# Patient Record
Sex: FEMALE | Race: WHITE | Employment: FULL TIME | RURAL
[De-identification: names, ages, dates, MRNs, and addresses within clinical notes are randomized per-mention and may not be internally consistent; named-entity substitution may affect disease eponyms.]

---

## 2020-10-01 PROBLEM — K21.9 GASTROESOPHAGEAL REFLUX DISEASE WITHOUT ESOPHAGITIS: Status: ACTIVE | Noted: 2020-10-01

## 2020-10-01 PROBLEM — E66.01 SEVERE OBESITY (HCC): Status: ACTIVE | Noted: 2020-10-01

## 2020-10-01 PROBLEM — E03.9 HYPOTHYROIDISM: Status: ACTIVE | Noted: 2020-10-01

## 2020-10-01 PROBLEM — I10 ESSENTIAL HYPERTENSION: Status: ACTIVE | Noted: 2020-10-01

## 2021-02-17 PROBLEM — E78.2 MIXED HYPERLIPIDEMIA: Status: ACTIVE | Noted: 2021-02-17

## 2021-02-17 PROBLEM — Z88.9 H/O MULTIPLE ALLERGIES: Status: ACTIVE | Noted: 2021-02-17

## 2021-02-17 PROBLEM — R00.2 PALPITATIONS: Status: ACTIVE | Noted: 2021-02-17

## 2021-02-28 PROBLEM — Z86.69 HISTORY OF MIGRAINE: Chronic | Status: ACTIVE | Noted: 2021-02-28

## 2021-02-28 PROBLEM — G47.00 INSOMNIA: Status: ACTIVE | Noted: 2021-02-28

## 2021-02-28 PROBLEM — Z87.898 HISTORY OF VERTIGO: Status: ACTIVE | Noted: 2021-02-28

## 2021-05-14 ENCOUNTER — OFFICE VISIT (OUTPATIENT)
Dept: CARDIOLOGY CLINIC | Age: 51
End: 2021-05-14
Payer: COMMERCIAL

## 2021-05-14 VITALS
OXYGEN SATURATION: 97 % | HEART RATE: 84 BPM | HEIGHT: 70 IN | BODY MASS INDEX: 37.51 KG/M2 | RESPIRATION RATE: 16 BRPM | DIASTOLIC BLOOD PRESSURE: 82 MMHG | TEMPERATURE: 96.6 F | SYSTOLIC BLOOD PRESSURE: 122 MMHG | WEIGHT: 262 LBS

## 2021-05-14 DIAGNOSIS — E03.9 ACQUIRED HYPOTHYROIDISM: ICD-10-CM

## 2021-05-14 DIAGNOSIS — R00.2 PALPITATIONS: Primary | ICD-10-CM

## 2021-05-14 DIAGNOSIS — E78.2 MIXED HYPERLIPIDEMIA: ICD-10-CM

## 2021-05-14 DIAGNOSIS — E66.01 SEVERE OBESITY (HCC): ICD-10-CM

## 2021-05-14 DIAGNOSIS — R07.9 CHEST PAIN, UNSPECIFIED TYPE: ICD-10-CM

## 2021-05-14 DIAGNOSIS — I10 ESSENTIAL HYPERTENSION: ICD-10-CM

## 2021-05-14 DIAGNOSIS — K21.9 GASTROESOPHAGEAL REFLUX DISEASE WITHOUT ESOPHAGITIS: ICD-10-CM

## 2021-05-14 DIAGNOSIS — R06.02 SOB (SHORTNESS OF BREATH): ICD-10-CM

## 2021-05-14 PROCEDURE — 99203 OFFICE O/P NEW LOW 30 MIN: CPT | Performed by: INTERNAL MEDICINE

## 2021-05-14 PROCEDURE — 93000 ELECTROCARDIOGRAM COMPLETE: CPT | Performed by: INTERNAL MEDICINE

## 2021-05-14 NOTE — PROGRESS NOTES
Jim Aldana is a 49 y.o. female is here for cardiac evaluation--sx of palpitations, chest discomfort. Hx hypertension, hypothryoidism, GERD, dyslipidemia, followed at [facility]. Sx of intermittent palpitations, assoc chest discomfort--not clearly exertional--can occur at night/rest, occ w/ exertion. Head pressure, dizziness upon standing after sitting long periods. Fairly sedentary, works on computer. No prior known cardiac hx or testing other than EKG. +FH DM, hypertension. Labs with dyslipidemia as noted--dietary rx/fishoit--improved but not ideal .  The patient denies  orthopnea, PND, LE edema, syncope, presyncope or fatigue.        Patient Active Problem List    Diagnosis Date Noted    Insomnia 02/28/2021    History of migraine 02/28/2021    History of vertigo 02/28/2021    H/O multiple allergies 02/17/2021    Palpitations 02/17/2021    Mixed hyperlipidemia 02/17/2021    Severe obesity ([address] 10/01/2020    Essential hypertension 10/01/2020    Hypothyroidism 10/01/2020    Gastroesophageal reflux disease without esophagitis 10/01/2020      Shelby Melgoza NP  Past Medical History:   Diagnosis Date    Dizziness     Dyslipidemia     GERD (gastroesophageal reflux disease)     HTN (hypertension)     Hypothyroidism     Migraines     Obesity     Vertigo       Past Surgical History:   Procedure Laterality Date    HX BREAST REDUCTION      HX HYSTERECTOMY       No Known Allergies   Family History   Problem Relation Age of Onset    Breast Cancer Sister       Social History     Socioeconomic History    Marital status:      Spouse name: Not on file    Number of children: Not on file    Years of education: Not on file    Highest education level: Not on file   Occupational History    Not on file   Social Needs    Financial resource strain: Not on file    Food insecurity     Worry: Not on file     Inability: Not on file    Transportation needs     Medical: Not on file Non-medical: Not on file   Tobacco Use    Smoking status: Former Smoker     Packs/day: 1.50     Quit date:      Years since quittin.3    Smokeless tobacco: Never Used   Substance and Sexual Activity    Alcohol use: Yes     Comment: wine    Drug use: Never    Sexual activity: Not on file   Lifestyle    Physical activity     Days per week: Not on file     Minutes per session: Not on file    Stress: Not on file   Relationships    Social connections     Talks on phone: Not on file     Gets together: Not on file     Attends Buddhism service: Not on file     Active member of club or organization: Not on file     Attends meetings of clubs or organizations: Not on file     Relationship status: Not on file    Intimate partner violence     Fear of current or ex partner: Not on file     Emotionally abused: Not on file     Physically abused: Not on file     Forced sexual activity: Not on file   Other Topics Concern    Not on file   Social History Narrative    Not on file      Current Outpatient Medications   Medication Sig    levothyroxine (Synthroid) 100 mcg tablet Take 1 Tab by mouth Daily (before breakfast).  omeprazole (PRILOSEC) 20 mg capsule TAKE 1 CAPSULE DAILY -GENERIC FOR PRILOSEC    Cozaar 25 mg tablet TAKE 1 TABLET DAILY    omega 3-dha-epa-fish oil (Fish Oil) 100-160-1,000 mg cap Take  by mouth.  magnesium 250 mg tab Take  by mouth.  cholecalciferol (VITAMIN D3) (2,000 UNITS /50 MCG) cap capsule Take  by mouth daily.  azelastine (ASTEPRO) 0.15 % (205.5 mcg) 1 Spray by Both Nostrils route two (2) times a day. No current facility-administered medications for this visit. Review of Symptoms:    CONST  No weight change. No fever, chills, sweats    ENT No visual changes, URI sx, sore throat    CV  See HPI   RESP  No cough, or sputum, wheezing. Also see HPI   GI  No abdominal pain or change in bowel habits. No heartburn or dysphagia. No melena or rectal bleeding.       No dysuria, urgency, frequency, hematuria   MSKEL  No joint pain, swelling. No muscle pain. SKIN  No rash or lesions. NEURO  No headache, syncope, or seizure. No weakness, loss of sensation, or paresthesias. PSYCH  No low mood or depression  No anxiety. HE/LYMPH  No easy bruising, abnormal bleeding, or enlarged glands. Physical ExamPhysical Exam:    Visit Vitals  /82   Pulse 84   Temp (!) 96.6 °F (35.9 °C) (Temporal)   Resp 16   Ht 5' 10\" (1.778 m)   Wt 262 lb (118.8 kg)   SpO2 97%   BMI 37.59 kg/m²     Gen: NAD  HEENT:  PERRL, throat clear  Neck: no adenopathy, no thyromegaly, no JVD   Heart:  Regular,Nl S1S2,  no murmur, gallop or rub. Lungs:  clear  Abdomen:   Soft, non-tender, bowel sounds are active. Extremities:  No edema  Pulse: symmetric  Neuro: A&O times 3, No focal neuro deficits    Cardiographics    ECG: NSR, PRWP/low voltage precordial leads      Labs:   Lab Results   Component Value Date/Time    Sodium 141 10/01/2020 11:42 AM    Potassium 5.2 10/01/2020 11:42 AM    Chloride 102 10/01/2020 11:42 AM    CO2 26 10/01/2020 11:42 AM    Glucose 101 (H) 10/01/2020 11:42 AM    BUN 12 10/01/2020 11:42 AM    Creatinine 0.92 10/01/2020 11:42 AM    BUN/Creatinine ratio 13 10/01/2020 11:42 AM    GFR est AA 85 10/01/2020 11:42 AM    GFR est non-AA 73 10/01/2020 11:42 AM    Calcium 10.1 10/01/2020 11:42 AM    Bilirubin, total 0.4 10/01/2020 11:42 AM    Alk.  phosphatase 85 10/01/2020 11:42 AM    Protein, total 7.6 10/01/2020 11:42 AM    Albumin 4.5 10/01/2020 11:42 AM    A-G Ratio 1.5 10/01/2020 11:42 AM    ALT (SGPT) 13 10/01/2020 11:42 AM     No results found for: CPK, CPKX, CPX  Lab Results   Component Value Date/Time    Cholesterol, total 214 (H) 02/26/2021 03:18 PM    Cholesterol, total 238 (H) 10/01/2020 11:42 AM    HDL Cholesterol 44 02/26/2021 03:18 PM    HDL Cholesterol 44 10/01/2020 11:42 AM    LDL, calculated 126 (H) 02/26/2021 03:18 PM    LDL, calculated 153 (H) 10/01/2020 11:42 AM    Triglyceride 249 (H) 02/26/2021 03:18 PM    Triglyceride 226 (H) 10/01/2020 11:42 AM     No results found for this or any previous visit. Assessment:         Patient Active Problem List    Diagnosis Date Noted    Insomnia 02/28/2021    History of migraine 02/28/2021    History of vertigo 02/28/2021    H/O multiple allergies 02/17/2021    Palpitations 02/17/2021    Mixed hyperlipidemia 02/17/2021    Severe obesity ([address] 10/01/2020    Essential hypertension 10/01/2020    Hypothyroidism 10/01/2020    Gastroesophageal reflux disease without esophagitis 10/01/2020     1 y.o. female is here for cardiac evaluation--sx of palpitations, chest discomfort. Hx hypertension, hypothryoidism, GERD, dyslipidemia, followed at [facility]. Sx of intermittent palpitations, assoc chest discomfort--not clearly exertional--can occur at night/rest, occ w/ exertion. Head pressure, dizziness upon standing after sitting long periods. Fairly sedentary, works on computer. No prior known cardiac hx or testing other than EKG. +FH DM, hypertension.  Labs with dyslipidemia as noted--dietary rx/fishoit--improved but not ideal .     Plan:     Holter monitor x 48 hrs  Echo/doppler--r/o structural heart disease  Stress treadmill EKG  Continue losartan  Continue fishoil, dietary rx, exercise (prefers to hold off on further rx currently)    Zoila Wallis MD

## 2021-05-14 NOTE — PROGRESS NOTES
Identified pt with two pt identifiers(name and ). Reviewed record in preparation for visit and have obtained necessary documentation. Chief Complaint   Patient presents with    Chest Pain     pt c/o intermittent chest pain and SOB, on exertion and at rest; sx present for several years but has been happening more often since moving to [place] 1.5 yrs ago; pain also radiates up neck    Palpitations    Ear Pressure     pt also reports pressure in head and ears when standing, after sitting for extended periods of time at work   [facility]     pt experienced episode of SOB while laying down during EKG today      Vitals:    21 1130   BP: 122/82   Pulse: 84   Resp: 16   Temp: (!) 96.6 °F (35.9 °C)   TempSrc: Temporal   SpO2: 97%   Weight: 262 lb (118.8 kg)   Height: 5' 10\" (1.778 m)   PainSc:   0 - No pain       Health Maintenance Review: Patient reminded of \"due or due soon\" health maintenance. I have asked the patient to contact his/her primary care provider (PCP) for follow-up on his/her health maintenance. Coordination of Care Questionnaire:  :   1) Have you been to an emergency room, urgent care, or hospitalized since your last visit? If yes, where when, and reason for visit? no       2. Have seen or consulted any other health care provider since your last visit? If yes, where when, and reason for visit? NO      Patient is accompanied by self I have received verbal consent from Misbah Williamson to discuss any/all medical information while they are present in the room.

## 2021-05-18 ENCOUNTER — TELEPHONE (OUTPATIENT)
Dept: CARDIOLOGY CLINIC | Age: 51
End: 2021-05-18

## 2021-05-18 ENCOUNTER — CLINICAL SUPPORT (OUTPATIENT)
Dept: CARDIOLOGY CLINIC | Age: 51
End: 2021-05-18
Payer: COMMERCIAL

## 2021-05-18 DIAGNOSIS — R00.2 PALPITATIONS: ICD-10-CM

## 2021-05-18 DIAGNOSIS — R07.9 CHEST PAIN, UNSPECIFIED TYPE: ICD-10-CM

## 2021-05-18 DIAGNOSIS — R06.02 SOB (SHORTNESS OF BREATH): ICD-10-CM

## 2021-05-18 NOTE — PROGRESS NOTES
HOME  hook up  HOLTER 48 hr monitor only. Verified patient with two patient identifiers. Patient verbalized understanding of its use. Ordering [employer]  Reason: Palpitations [R00.2 (ICD-10-CM)]; SOB (shortness of breath) [R06.02 (ICD-10-CM)]; Chest pain, unspecified type [R07.9 (ICD-10-CM)]      Patient has been successfully enrolled through [employer]. No LOS.

## 2021-05-18 NOTE — TELEPHONE ENCOUNTER
----- Message from Selin Sanchez MD sent at 5/14/2021  2:03 PM EDT -----  Regarding: Holter  Needs 48 hr Holter mailed.   Thanks [facility]

## 2021-06-04 ENCOUNTER — TELEPHONE (OUTPATIENT)
Dept: CARDIOLOGY CLINIC | Age: 51
End: 2021-06-04

## 2021-06-04 PROCEDURE — 93227 XTRNL ECG REC<48 HR R&I: CPT | Performed by: INTERNAL MEDICINE

## 2021-06-04 NOTE — TELEPHONE ENCOUNTER
Called and spoke with pt informing of the following per Dr. Jordan Ruby:      MD Romaine Silveira LPN  Advise Holter monitor ok--only isolated skipped beats (PAC's), no concerns.  Other tests pending. [facility]       Pt verbalized understanding and agreed with plan.

## 2021-07-13 ENCOUNTER — RECORDS - HEALTHEAST (OUTPATIENT)
Dept: ADMINISTRATIVE | Facility: CLINIC | Age: 51
End: 2021-07-13

## 2021-07-21 ENCOUNTER — RECORDS - HEALTHEAST (OUTPATIENT)
Dept: ADMINISTRATIVE | Facility: CLINIC | Age: 51
End: 2021-07-21

## 2021-08-27 ENCOUNTER — OFFICE VISIT (OUTPATIENT)
Dept: FAMILY MEDICINE CLINIC | Age: 51
End: 2021-08-27
Payer: COMMERCIAL

## 2021-08-27 VITALS
HEIGHT: 70 IN | TEMPERATURE: 97.7 F | OXYGEN SATURATION: 96 % | WEIGHT: 250.38 LBS | RESPIRATION RATE: 18 BRPM | BODY MASS INDEX: 35.84 KG/M2 | HEART RATE: 71 BPM | SYSTOLIC BLOOD PRESSURE: 116 MMHG | DIASTOLIC BLOOD PRESSURE: 77 MMHG

## 2021-08-27 DIAGNOSIS — Z82.49 FAMILY HISTORY OF AORTIC ANEURYSM: ICD-10-CM

## 2021-08-27 DIAGNOSIS — R07.9 CHEST PAIN, UNSPECIFIED TYPE: ICD-10-CM

## 2021-08-27 DIAGNOSIS — R00.2 PALPITATIONS: ICD-10-CM

## 2021-08-27 DIAGNOSIS — E03.9 ACQUIRED HYPOTHYROIDISM: ICD-10-CM

## 2021-08-27 DIAGNOSIS — R76.8 POSITIVE HEPATITIS C ANTIBODY TEST: ICD-10-CM

## 2021-08-27 DIAGNOSIS — G47.00 INSOMNIA, UNSPECIFIED TYPE: ICD-10-CM

## 2021-08-27 DIAGNOSIS — I10 ESSENTIAL HYPERTENSION: Primary | ICD-10-CM

## 2021-08-27 DIAGNOSIS — K21.9 GASTROESOPHAGEAL REFLUX DISEASE WITHOUT ESOPHAGITIS: ICD-10-CM

## 2021-08-27 DIAGNOSIS — E66.01 SEVERE OBESITY (HCC): ICD-10-CM

## 2021-08-27 DIAGNOSIS — I10 ESSENTIAL HYPERTENSION: ICD-10-CM

## 2021-08-27 DIAGNOSIS — E78.2 MIXED HYPERLIPIDEMIA: ICD-10-CM

## 2021-08-27 PROCEDURE — 99214 OFFICE O/P EST MOD 30 MIN: CPT | Performed by: NURSE PRACTITIONER

## 2021-08-27 RX ORDER — LOSARTAN POTASSIUM 25 MG/1
TABLET ORAL
Qty: 90 TABLET | Refills: 3 | Status: SHIPPED | OUTPATIENT
Start: 2021-08-27 | End: 2022-07-09

## 2021-08-27 NOTE — PROGRESS NOTES
Subjective:     Chief Complaint   Patient presents with    Hypertension    GERD    Thyroid Problem       Jerri Boyce is a 49 y.o. female who presents today for a 6 month follow up for hypothyroidism, GERD, and hypertension. She moved to [facility] about 1.5 years ago from [place]. She moved to be closer to her daughter and 3yo grandson. Her daughter is expecting another baby in November. She works as a  in a [employer]. New issues: At her last OV her one time screening for Hep C was positive. We had attempted to contact her about the need to do the RNA test to confirm the results but were unable to get in touch with her. She would like to do this today. denies any fatigue, abdominal pain, N/V, or diarrhea. Hypertension  She takes losartan 25 mg daily. BP is at goal today. She does not check her blood pressure at home. At her last appt she reported intermittent chest pain, SOB, and palpitations x 6 months. An EKG was done and showed NSR. Lytes were normal. She was referred to cardiologist Dr Amberly Lynch who ordered a Holter monitor. It showed PAC's, no concerning arrhythmias. He also ordered an ECHO and stress test but these were never completed. States she was worried about the cost as she has a high deductible and they were only offering the tests on Tuesdays and Thursdays which conflicted with her work schedule. Obesity  She gained 30 pounds in the last year but has lost 10 pounds in the past 3 months with diet and walking more for exercise. Has noticed her SOB has improved. GERD  Symptoms well controlled with omeprazole 20 mg daily. Hypothyroidism  Lab Results   Component Value Date/Time    TSH 2.650 02/26/2021 03:18 PM     She is on Synthroid 100 mcg daily.      HLD  Lab Results   Component Value Date/Time    Cholesterol, total 214 (H) 02/26/2021 03:18 PM    HDL Cholesterol 44 02/26/2021 03:18 PM    LDL, calculated 126 (H) 02/26/2021 03:18 PM    VLDL, calculated 44 (H) 02/26/2021 03:18 PM    Triglyceride 249 (H) 02/26/2021 03:18 PM     Dr Alla Barros told her to cont taking her fish oil pill for now and work on diet and exercise which she has been doing. FH of aneurisms  Her sister has a brain aneurism. Her brother has an aneurism in his brain, heart, and aorta. .   Their dad and paternal grandmother both had hemorrhagic strokes. At the last OV she was requesting to be checked for aneurisms. An MRA of the brain was ordered to screen for brain aneurism and an abdominal ultrasound to screen for AAA was ordered but never completed, she was worried about the cost.    Allergies  Has multiple environmental allergies and uses Azelastine NS. Migraines  She has a hx of migraine headaches that started when she was 22years old. They tend to come in \"clusters. \"     She has a strong FH of diabetes- her dad and 4 sisters have it. Patient has hx of pre-diabetes but at the last appt her A1C was normal    Lab Results   Component Value Date/Time    Hemoglobin A1c 5.5 02/26/2021 03:18 PM       A the last OV she was having difficulty staying asleep. She is now taking OTC Melatonin which helps. She takes a magnesium pill at night for constipation. Health maintenance  PAP- She has had a hysterectomy.   Mammo- done 10/2020 (normal) There is a family history of breast cancer in her sister, diagnosed at age 36  Colonoscopy- not addressed today  Flu shot- done 10/2020  Shingrix- she is interested but would like to get the Covid 19 vaccine first  Covid 19 vaccine- she has had both Moderna vaccines       Patient Active Problem List   Diagnosis Code    Severe obesity ([address] E66.01    Essential hypertension I10    Hypothyroidism E03.9    Gastroesophageal reflux disease without esophagitis K21.9    H/O multiple allergies Z91.89    Palpitations R00.2    Mixed hyperlipidemia E78.2    Insomnia G47.00    History of migraine Z86.69    History of vertigo Z87.898       Past Medical History: Diagnosis Date    Dizziness     Dyslipidemia     GERD (gastroesophageal reflux disease)     HTN (hypertension)     Hypothyroidism     Migraines     Obesity     Vertigo          Current Outpatient Medications:     Cozaar 25 mg tablet, TAKE 1 TABLET DAILY, Disp: 30 Tablet, Rfl: 0    Synthroid 100 mcg tablet, TAKE 1 TABLET DAILY BEFORE BREAKFAST, Disp: 90 Tablet, Rfl: 0    omeprazole (PRILOSEC) 20 mg capsule, TAKE 1 CAPSULE DAILY -GENERIC FOR PRILOSEC, Disp: 90 Capsule, Rfl: 0    omega 3-dha-epa-fish oil (Fish Oil) 100-160-1,000 mg cap, Take  by mouth., Disp: , Rfl:     magnesium 250 mg tab, Take  by mouth., Disp: , Rfl:     cholecalciferol (VITAMIN D3) (2,000 UNITS /50 MCG) cap capsule, Take  by mouth daily. , Disp: , Rfl:     azelastine (ASTEPRO) 0.15 % (205.5 mcg), 1 Spray by Both Nostrils route two (2) times a day., Disp: 1 Bottle, Rfl: 5    No Known Allergies    Past Surgical History:   Procedure Laterality Date    HX BREAST REDUCTION      HX HYSTERECTOMY         Social History     Tobacco Use   Smoking Status Former Smoker    Packs/day: 1.50    Quit date:     Years since quittin.6   Smokeless Tobacco Never Used       Social History     Socioeconomic History    Marital status:      Spouse name: Not on file    Number of children: Not on file    Years of education: Not on file    Highest education level: Not on file   Tobacco Use    Smoking status: Former Smoker     Packs/day: 1.50     Quit date:      Years since quittin.6    Smokeless tobacco: Never Used   Substance and Sexual Activity    Alcohol use: Yes     Comment: wine    Drug use: Never     Social Determinants of Health     Financial Resource Strain:     Difficulty of Paying Living Expenses:    Food Insecurity:     Worried About Running Out of Food in the Last Year:     Ran Out of Food in the Last Year:    Transportation Needs:     Lack of Transportation (Medical):      Lack of Transportation (Non-Medical):    Physical Activity:     Days of Exercise per Week:     Minutes of Exercise per Session:    Stress:     Feeling of Stress :    Social Connections:     Frequency of Communication with Friends and Family:     Frequency of Social Gatherings with Friends and Family:     Attends Synagogue Services:     Active Member of Clubs or Organizations:     Attends Club or Organization Meetings:     Marital Status:        Family History   Problem Relation Age of Onset    Breast Cancer Sister     Diabetes Sister     Diabetes Father        ROS:  Gen: denies fever, chills, or fatigue   HEENT:+hx of migraines denies nasal congestion, ear pain, or sore throat  Resp: + [place]-improved with weight loss denies cough, or wheezing  CV: +intermittent chest pain and palpitations  Extremeties: denies edema  GI[de-identified] denies abdominal pain, nausea, vomiting, or diarrhea, + constipation  Musculoskeletal: no joint pain, stiffness, or muscle cramps  Neuro: denies numbness/tingling +occas vertigo   Endo: denies polyuria or polydipsia, +hot flashes  Skin: denies rashes or new lesions   Psych: +insomnia denies anxiety or depression    Objective:     Visit Vitals  /77 (BP 1 Location: Left arm, BP Patient Position: Sitting)   Pulse 71   Temp 97.7 °F (36.5 °C) (Temporal)   Resp 18   Ht 5' 10\" (1.778 m)   Wt 250 lb 6 oz (113.6 kg)   SpO2 96%   BMI 35.93 kg/m²     Body mass index is 35.93 kg/m². General: Alert and oriented. No acute distress. +overweight  HEENT :  Eyes: Sclera white, conjunctiva clear. PERRLA. Extra ocular movements intact. Neck: Supple with FROM. Lungs: Breathing even and unlabored. All lobes clear to auscultation bilaterally   Heart :RRR, S1 and S2 normal intensity, no extra heart sounds  Extremities: Non-edematous  Neuro: Cranial nerves grossly normal.  Psych: Mood and thought content appropriate for situation. Dressed appropriately and with good hygiene. Skin: Warm, dry, and intact.  No lesions or discoloration. Assessment/ Plan:     Hypothyroidism  Recheck TSH- she will RTO another day when she is fasting since we are rechecking lipids also  Cont Synthroid    GERD  Cont PPI    HTN  BP at goal  Cont Losartan  Will check CMP  Low-sodium diet  Exercise  RTO or go to ER for any CP, SOB, dizziness, or swelling. HLD  Will recheck lipids when she is fasting  Cont fish oil  Low fat diet  Exercise  F/U 6 months    Obesity  Cont to work on weight loss with diet and exercise    Insomnia  Cont Melatonin    Palpitations, chest pain, and SOB  She will call and ask her insurance how much she can expect to pay for the ECHO and Stress test   If affordable she will let us know and we can try to have this scheduled in [place] as their schedule may be different from [address] to ER if symptoms worsen    Positive hepatitis C screening  HEPATITIS RNA EREN test ordered to verify results     Family history of aortic aneurysm  Recommended keeping BP and cholesterol under tight control  Go to ER for sudden CP, SOB, severe headache or abd pain  May get abd ultrasound to screen for AAA - she will check on the cost  May get MRA of brain to screen for brain aneurism- she will check on cost with her insurance    If labs normal she can F/U in 1 year or sooner if problems arise       No orders of the defined types were placed in this encounter. Verbal and written instructions (see AVS) provided.  Patient expresses understanding of diagnosis and treatment plan. Health Maintenance Due   Topic Date Due    Colorectal Cancer Screening Combo  Never done    Shingrix Vaccine Age 49> (1 of 2) Never done               Mikey Hickey, REYMUNDO

## 2021-08-27 NOTE — PROGRESS NOTES
1. Have you been to the ER, urgent care clinic since your last visit? Hospitalized since your last visit? No    2. Have you seen or consulted any other health care providers outside of the [address] [place] [address] since your last visit? Include any pap smears or colon screening.  No     Chief Complaint   Patient presents with    Hypertension    GERD    Thyroid Problem     Visit Vitals  /77 (BP 1 Location: Left arm, BP Patient Position: Sitting)   Pulse 71   Temp 97.7 °F (36.5 °C) (Temporal)   Resp 18   Ht 5' 10\" (1.778 m)   Wt 250 lb 6 oz (113.6 kg)   SpO2 96%   BMI 35.93 kg/m²

## 2021-09-17 ENCOUNTER — LAB ONLY (OUTPATIENT)
Dept: FAMILY MEDICINE CLINIC | Age: 51
End: 2021-09-17

## 2021-09-17 DIAGNOSIS — E78.2 MIXED HYPERLIPIDEMIA: ICD-10-CM

## 2021-09-17 DIAGNOSIS — E03.9 ACQUIRED HYPOTHYROIDISM: ICD-10-CM

## 2021-09-17 DIAGNOSIS — I10 ESSENTIAL HYPERTENSION: ICD-10-CM

## 2021-09-18 LAB
ALBUMIN SERPL-MCNC: 3.9 G/DL (ref 3.5–5)
ALBUMIN/GLOB SERPL: 1.1 {RATIO} (ref 1.1–2.2)
ALP SERPL-CCNC: 76 U/L (ref 45–117)
ALT SERPL-CCNC: 34 U/L (ref 12–78)
ANION GAP SERPL CALC-SCNC: 3 MMOL/L (ref 5–15)
AST SERPL-CCNC: 16 U/L (ref 15–37)
BILIRUB SERPL-MCNC: 0.6 MG/DL (ref 0.2–1)
BUN SERPL-MCNC: 11 MG/DL (ref 6–20)
BUN/CREAT SERPL: 11 (ref 12–20)
CALCIUM SERPL-MCNC: 9.4 MG/DL (ref 8.5–10.1)
CHLORIDE SERPL-SCNC: 108 MMOL/L (ref 97–108)
CHOLEST SERPL-MCNC: 221 MG/DL
CO2 SERPL-SCNC: 28 MMOL/L (ref 21–32)
CREAT SERPL-MCNC: 1.04 MG/DL (ref 0.55–1.02)
ERYTHROCYTE [DISTWIDTH] IN BLOOD BY AUTOMATED COUNT: 12.7 % (ref 11.5–14.5)
GLOBULIN SER CALC-MCNC: 3.5 G/DL (ref 2–4)
GLUCOSE SERPL-MCNC: 90 MG/DL (ref 65–100)
HCT VFR BLD AUTO: 41.7 % (ref 35–47)
HDLC SERPL-MCNC: 43 MG/DL
HDLC SERPL: 5.1 {RATIO} (ref 0–5)
HGB BLD-MCNC: 13.4 G/DL (ref 11.5–16)
LDLC SERPL CALC-MCNC: 140.4 MG/DL (ref 0–100)
MCH RBC QN AUTO: 29.5 PG (ref 26–34)
MCHC RBC AUTO-ENTMCNC: 32.1 G/DL (ref 30–36.5)
MCV RBC AUTO: 91.9 FL (ref 80–99)
NRBC # BLD: 0 K/UL (ref 0–0.01)
NRBC BLD-RTO: 0 PER 100 WBC
PLATELET # BLD AUTO: 330 K/UL (ref 150–400)
PMV BLD AUTO: 11.2 FL (ref 8.9–12.9)
POTASSIUM SERPL-SCNC: 4.8 MMOL/L (ref 3.5–5.1)
PROT SERPL-MCNC: 7.4 G/DL (ref 6.4–8.2)
RBC # BLD AUTO: 4.54 M/UL (ref 3.8–5.2)
SODIUM SERPL-SCNC: 139 MMOL/L (ref 136–145)
TRIGL SERPL-MCNC: 188 MG/DL (ref ?–150)
TSH SERPL DL<=0.05 MIU/L-ACNC: 4.28 UIU/ML (ref 0.36–3.74)
VLDLC SERPL CALC-MCNC: 37.6 MG/DL
WBC # BLD AUTO: 7.8 K/UL (ref 3.6–11)

## 2021-09-20 NOTE — PROGRESS NOTES
TSH is slightly elevated- has she been taking the Levothyroxine consistently? If so we need to increase dose. Unfortunately the hepatitis C test was not collected- can we add this on? Order in computer from previous date.  (Hep C RNA) Cholesterol still elevated- work on low fat diet

## 2021-09-21 RX ORDER — LEVOTHYROXINE SODIUM 112 UG/1
112 TABLET ORAL
Qty: 90 TABLET | Refills: 0 | Status: SHIPPED | OUTPATIENT
Start: 2021-09-21 | End: 2021-12-06

## 2021-09-21 NOTE — PROGRESS NOTES
Pt aware 2 identifiers verified name and    She says she has been taking thyroid med regularly and needs increase dose sent to mail in pharmacy she will come in to have hep c RNA drawn

## 2021-09-24 ENCOUNTER — LAB ONLY (OUTPATIENT)
Dept: FAMILY MEDICINE CLINIC | Age: 51
End: 2021-09-24

## 2021-09-24 DIAGNOSIS — R76.8 POSITIVE HEPATITIS C ANTIBODY TEST: ICD-10-CM

## 2021-09-27 LAB — HCV RNA SERPL QL NAA+PROBE: NEGATIVE

## 2021-09-30 DIAGNOSIS — K21.9 GASTROESOPHAGEAL REFLUX DISEASE WITHOUT ESOPHAGITIS: ICD-10-CM

## 2021-09-30 RX ORDER — OMEPRAZOLE 20 MG/1
CAPSULE, DELAYED RELEASE ORAL
Qty: 90 CAPSULE | Refills: 0 | Status: SHIPPED | OUTPATIENT
Start: 2021-09-30 | End: 2021-12-06

## 2021-10-07 ENCOUNTER — OFFICE VISIT (OUTPATIENT)
Dept: FAMILY MEDICINE CLINIC | Age: 51
End: 2021-10-07
Payer: COMMERCIAL

## 2021-10-07 VITALS
RESPIRATION RATE: 18 BRPM | HEIGHT: 70 IN | BODY MASS INDEX: 35.93 KG/M2 | OXYGEN SATURATION: 96 % | TEMPERATURE: 98.1 F | DIASTOLIC BLOOD PRESSURE: 76 MMHG | HEART RATE: 67 BPM | SYSTOLIC BLOOD PRESSURE: 134 MMHG

## 2021-10-07 DIAGNOSIS — M54.50 ACUTE BILATERAL LOW BACK PAIN WITHOUT SCIATICA: Primary | ICD-10-CM

## 2021-10-07 PROCEDURE — 99213 OFFICE O/P EST LOW 20 MIN: CPT | Performed by: NURSE PRACTITIONER

## 2021-10-07 RX ORDER — PREDNISONE 10 MG/1
TABLET ORAL
Qty: 21 TABLET | Refills: 0 | Status: SHIPPED | OUTPATIENT
Start: 2021-10-07 | End: 2022-04-11 | Stop reason: ALTCHOICE

## 2021-10-07 RX ORDER — IBUPROFEN 800 MG/1
800 TABLET ORAL
Qty: 60 TABLET | Refills: 0 | Status: SHIPPED | OUTPATIENT
Start: 2021-10-07

## 2021-10-07 NOTE — PROGRESS NOTES
Subjective:     Chief Complaint   Patient presents with    Back Pain     started this am       Christina Sylvester is a 49 y.o. female who presents with c/o acute low back pain that started this morning. She was sitting in a small chair very low to the ground coloring with her grandson at a table and when she stood up she felt a sudden severe sharp pain in the middle of her lower back. This was followed by multiple muscle spasms on both sides of her lower back that lasted about 1/2 hour. She then applied an ice pack and took 4 ibuprofen pills which improved her pain somewhat to the point where she could walk. She then tried to stretch her back muscles but pain is still pretty severe so she called and scheduled an appt to be seen. She denies any leg pain, weakness, or paresthesia. Denies any saddle anesthesia or loss of control over bowels or bladder. Patient Active Problem List   Diagnosis Code    Severe obesity ([facility] [address] E66.01    Essential hypertension I10    Hypothyroidism E03.9    Gastroesophageal reflux disease without esophagitis K21.9    H/O multiple allergies Z91.89    Palpitations R00.2    Mixed hyperlipidemia E78.2    Insomnia G47.00    History of migraine Z86.69    History of vertigo Z87.898       Past Medical History:   Diagnosis Date    Dizziness     Dyslipidemia     GERD (gastroesophageal reflux disease)     HTN (hypertension)     Hypothyroidism     Migraines     Obesity     Vertigo          Current Outpatient Medications:     omeprazole (PRILOSEC) 20 mg capsule, TAKE 1 CAPSULE DAILY -GENERIC FOR PRILOSEC, Disp: 90 Capsule, Rfl: 0    levothyroxine (SYNTHROID) 112 mcg tablet, Take 1 Tablet by mouth Daily (before breakfast). , Disp: 90 Tablet, Rfl: 0    losartan (Cozaar) 25 mg tablet, TAKE 1 TABLET DAILY, Disp: 90 Tablet, Rfl: 3    omega 3-dha-epa-fish oil (Fish Oil) 100-160-1,000 mg cap, Take  by mouth., Disp: , Rfl:     magnesium 250 mg tab, Take  by mouth., Disp: , Rfl:    cholecalciferol (VITAMIN D3) (2,000 UNITS /50 MCG) cap capsule, Take  by mouth daily. , Disp: , Rfl:     azelastine (ASTEPRO) 0.15 % (205.5 mcg), 1 Spray by Both Nostrils route two (2) times a day., Disp: 1 Bottle, Rfl: 5    No Known Allergies    Past Surgical History:   Procedure Laterality Date    HX BREAST REDUCTION      HX HYSTERECTOMY         Social History     Tobacco Use   Smoking Status Former Smoker    Packs/day: .    Quit date:     Years since quittin.   Smokeless Tobacco Never Used       Social History     Socioeconomic History    Marital status:      Spouse name: Not on file    Number of children: Not on file    Years of education: Not on file    Highest education level: Not on file   Tobacco Use    Smoking status: Former Smoker     Packs/day: .     Quit date:      Years since quittin.    Smokeless tobacco: Never Used   Substance and Sexual Activity    Alcohol use: Yes     Comment: wine    Drug use: Never     Social Determinants of Health     Financial Resource Strain:     Difficulty of Paying Living Expenses:    Food Insecurity:     Worried About Running Out of Food in the Last Year:     Ran Out of Food in the Last Year:    Transportation Needs:     Lack of Transportation (Medical):      Lack of Transportation (Non-Medical):    Physical Activity:     Days of Exercise per Week:     Minutes of Exercise per Session:    Stress:     Feeling of Stress :    Social Connections:     Frequency of Communication with Friends and Family:     Frequency of Social Gatherings with Friends and Family:     Attends [employer] Services:     Active Member of Clubs or Organizations:     Attends Club or Organization Meetings:     Marital Status:        Family History   Problem Relation Age of Onset    Breast Cancer Sister     Diabetes Sister     Diabetes Father        ROS:  Gen: denies fever, chills, or fatigue   Resp: denies dyspna, cough, or wheezing  CV: denies chest pain or pressure  Extremeties: denies edema  GI[de-identified] denies abdominal pain, nausea, or vomiting  Musculoskeletal: +acute low back pain with muscle spasms  Neuro: denies numbness/tingling, lower extremity weakness or paresthesia   Skin: denies rashes or new lesions     Objective:     Visit Vitals  /76 (BP 1 Location: Left arm, BP Patient Position: Sitting)   Pulse 67   Temp 98.1 °F (36.7 °C) (Temporal)   Resp 18   Ht 5' 10\" (1.778 m)   SpO2 96%   BMI 35.93 kg/m²     Body mass index is 35.93 kg/m². General: Alert and oriented. +wincing in pain with movement, trying to support herself in chair to avoid back pain. +overweight  HEENT :  Eyes: Sclera white, conjunctiva clear. PERRLA. Extra ocular movements intact. Neck: Supple with FROM. Lungs: Breathing even and unlabored. All lobes clear to auscultation bilaterally   Heart :RRR, S1 and S2 normal intensity, no extra heart sounds  Extremities: Non-edematous  Back: +TTP to lumbar spine and bilateral paraspinal muscles, no drop offs palpitated, very limited ROM d/t pain  Neuro: Cranial nerves grossly normal. Sensation intact. Strength intact to BLE's  Psych: Mood and thought content appropriate for situation. Dressed appropriately and with good hygiene. Skin: Warm, dry, and intact. No lesions or discoloration.     Assessment/ Plan:     Acute bilateral lower back pain without sciatica  Suggested we get a lumbar xray to check for stress fx but she declines  Start prednisone 10mg- take 6 pills today then take 1 less pill every day until gone (#21, 0R)  Suggested muscle relaxor but she would rather take a high dose ibuprofen  Script sent for ibuprofen 800mg q 6 hours prn pain  Cont heating pad and back stretching exercises daily  Avoid heavy lifting, pulling, or pushing for the next week or so  F/U 2 weeks prn if pain does not improve      Verbal and written instructions (see AVS) provided.  Patient expresses understanding of diagnosis and treatment plan.    Health Maintenance Due   Topic Date Due    Colorectal Cancer Screening Combo  Never done    Shingrix Vaccine Age 50> (1 of 2) Never done    Flu Vaccine (1) 09/01/2021    Breast Cancer Screen Mammogram  10/30/2021               Scar Salcido, NP

## 2021-10-07 NOTE — PROGRESS NOTES
1. Have you been to the ER, urgent care clinic since your last visit? Hospitalized since your last visit? No    2. Have you seen or consulted any other health care providers outside of the [address] [place] [address] since your last visit? Include any pap smears or colon screening.  No   Chief Complaint   Patient presents with    Back Pain     started this am     Visit Vitals  /76 (BP 1 Location: Left arm, BP Patient Position: Sitting)   Pulse 67   Temp 98.1 °F (36.7 °C) (Temporal)   Resp 18   Ht 5' 10\" (1.778 m)   SpO2 96%   BMI 35.93 kg/m²

## 2021-11-12 ENCOUNTER — CLINICAL SUPPORT (OUTPATIENT)
Dept: FAMILY MEDICINE CLINIC | Age: 51
End: 2021-11-12
Payer: COMMERCIAL

## 2021-11-12 DIAGNOSIS — E03.9 ACQUIRED HYPOTHYROIDISM: ICD-10-CM

## 2021-11-12 DIAGNOSIS — Z23 NEEDS FLU SHOT: Primary | ICD-10-CM

## 2021-11-12 LAB — TSH SERPL DL<=0.05 MIU/L-ACNC: 1.94 UIU/ML (ref 0.36–3.74)

## 2021-11-12 PROCEDURE — 36415 COLL VENOUS BLD VENIPUNCTURE: CPT | Performed by: NURSE PRACTITIONER

## 2021-11-12 PROCEDURE — 90686 IIV4 VACC NO PRSV 0.5 ML IM: CPT | Performed by: FAMILY MEDICINE

## 2021-11-12 PROCEDURE — 90471 IMMUNIZATION ADMIN: CPT | Performed by: FAMILY MEDICINE

## 2021-11-12 NOTE — PROGRESS NOTES
Patient was administered Flu shot in left deltoid via IM. Patient tolerated Flu shot well. Medication information reviewed with patient, patient states understanding. Patient to resume routine medications at home. Patient given copy of AVS and VIIS with medication information and instructions for home. VIIS reviewed with patient and patient states understanding.

## 2021-11-12 NOTE — PATIENT INSTRUCTIONS
Vaccine Information Statement    Influenza (Flu) Vaccine (Inactivated or Recombinant): What You Need to Know    Many vaccine information statements are available in English and other languages. See www.immunize.org/vis. Hojas de información sobre vacunas están disponibles en español y en muchos otros idiomas. Visite www.immunize.org/vis. 1. Why get vaccinated? Influenza vaccine can prevent influenza (flu). Flu is a contagious disease that spreads around the [facility] every year, usually between October and May. Anyone can get the flu, but it is more dangerous for some people. Infants and young children, people 72 years and older, pregnant people, and people with certain health conditions or a weakened immune system are at greatest risk of flu complications. Pneumonia, bronchitis, sinus infections, and ear infections are examples of flu-related complications. If you have a medical condition, such as heart disease, cancer, or diabetes, flu can make it worse. Flu can cause fever and chills, sore throat, muscle aches, fatigue, cough, headache, and runny or stuffy nose. Some people may have vomiting and diarrhea, though this is more common in children than adults. In an average year, thousands of people in the [facility] die from flu, and many more are hospitalized. Flu vaccine prevents millions of illnesses and flu-related visits to the doctor each year. 2. Influenza vaccines     CDC recommends everyone 6 months and older get vaccinated every flu season. Children 6 months through 6years of age may need 2 doses during a single flu season. Everyone else needs only 1 dose each flu season. It takes about 2 weeks for protection to develop after vaccination. There are many flu viruses, and they are always changing. Each year a new flu vaccine is made to protect against the influenza viruses believed to be likely to cause disease in the upcoming flu season.  Even when the vaccine doesnt exactly match these viruses, it may still provide some protection. Influenza vaccine does not cause flu. Influenza vaccine may be given at the same time as other vaccines. 3. Talk with your health care provider    Tell your vaccination provider if the person getting the vaccine:   Has had an allergic reaction after a previous dose of influenza vaccine, or has any severe, life-threatening allergies    Has ever had Guillain-Barré Syndrome (also called GBS)    In some cases, your health care provider may decide to postpone influenza vaccination until a future visit. Influenza vaccine can be administered at any time during pregnancy. People who are or will be pregnant during influenza season should receive inactivated influenza vaccine. People with minor illnesses, such as a cold, may be vaccinated. People who are moderately or severely ill should usually wait until they recover before getting influenza vaccine. Your health care provider can give you more information. 4. Risks of a vaccine reaction     Soreness, redness, and swelling where the shot is given, fever, muscle aches, and headache can happen after influenza vaccination.  There may be a very small increased risk of Guillain-Barré Syndrome (GBS) after inactivated influenza vaccine (the flu shot). [facility] children who get the flu shot along with pneumococcal vaccine (PCV13) and/or DTaP vaccine at the same time might be slightly more likely to have a seizure caused by fever. Tell your health care provider if a child who is getting flu vaccine has ever had a seizure. People sometimes faint after medical procedures, including vaccination. Tell your provider if you feel dizzy or have vision changes or ringing in the ears. As with any medicine, there is a very remote chance of a vaccine causing a severe allergic reaction, other serious injury, or death. 5. What if there is a serious problem?     An allergic reaction could occur after the vaccinated person leaves the clinic. If you see signs of a severe allergic reaction (hives, swelling of the face and throat, difficulty breathing, a fast heartbeat, dizziness, or weakness), call 9-1-1 and get the person to the nearest hospital.    For other signs that concern you, call your health care provider. Adverse reactions should be reported to the Vaccine Adverse Event Reporting System (VAERS). Your health care provider will usually file this report, or you can do it yourself. Visit the VAERS website at www.vaers. [facility].gov or call 5-193.225.4179. VAERS is only for reporting reactions, and VAERS staff members do not give medical advice. 6. The National Vaccine Injury Compensation Program    The [facility], [facility] Vaccine Injury Compensation Program (VICP) is a federal program that was created to compensate people who may have been injured by certain vaccines. Claims regarding alleged injury or death due to vaccination have a time limit for filing, which may be as short as two years. Visit the VICP website at www.[facility].gov/vaccinecompensation or call 7-557.726.4520 to learn about the program and about filing a claim. 7. How can I learn more?  Ask your health care provider.  Call your local or state health department.  Visit the website of the Food and Drug Administration (FDA) for vaccine package inserts and additional information at www.fda.gov/vaccines-blood-biologics/vaccines.  Contact the Centers for Disease Control and Prevention (CDC):  - Call 2-366.200.5342 (1-800-CDC-INFO) or  - Visit CDCs influenza website at www.cdc.gov/flu. Vaccine Information Statement   Inactivated Influenza Vaccine   8/6/2021  [address] [id number]   Department of Health and Human Services  Centers for Disease Control and Prevention    Office Use Only

## 2021-12-03 DIAGNOSIS — K21.9 GASTROESOPHAGEAL REFLUX DISEASE WITHOUT ESOPHAGITIS: ICD-10-CM

## 2021-12-06 RX ORDER — LEVOTHYROXINE SODIUM 112 UG/1
TABLET ORAL
Qty: 90 TABLET | Refills: 0 | Status: SHIPPED | OUTPATIENT
Start: 2021-12-06 | End: 2022-02-05

## 2021-12-06 RX ORDER — OMEPRAZOLE 20 MG/1
CAPSULE, DELAYED RELEASE ORAL
Qty: 90 CAPSULE | Refills: 0 | Status: SHIPPED | OUTPATIENT
Start: 2021-12-06 | End: 2022-02-05

## 2022-01-07 ENCOUNTER — HOSPITAL ENCOUNTER (OUTPATIENT)
Dept: MAMMOGRAPHY | Age: 52
Discharge: HOME | End: 2022-01-07
Payer: COMMERCIAL

## 2022-01-07 DIAGNOSIS — Z12.31 VISIT FOR SCREENING MAMMOGRAM: ICD-10-CM

## 2022-01-07 PROCEDURE — 77063 BREAST TOMOSYNTHESIS BI: CPT

## 2022-02-04 DIAGNOSIS — K21.9 GASTROESOPHAGEAL REFLUX DISEASE WITHOUT ESOPHAGITIS: ICD-10-CM

## 2022-02-05 RX ORDER — LEVOTHYROXINE SODIUM 112 UG/1
TABLET ORAL
Qty: 90 TABLET | Refills: 0 | Status: SHIPPED | OUTPATIENT
Start: 2022-02-05 | End: 2022-04-11

## 2022-02-05 RX ORDER — OMEPRAZOLE 20 MG/1
CAPSULE, DELAYED RELEASE ORAL
Qty: 90 CAPSULE | Refills: 0 | Status: SHIPPED | OUTPATIENT
Start: 2022-02-05 | End: 2022-04-11

## 2022-03-18 PROBLEM — I10 ESSENTIAL HYPERTENSION: Status: ACTIVE | Noted: 2020-10-01

## 2022-03-18 PROBLEM — R00.2 PALPITATIONS: Status: ACTIVE | Noted: 2021-02-17

## 2022-03-19 PROBLEM — K21.9 GASTROESOPHAGEAL REFLUX DISEASE WITHOUT ESOPHAGITIS: Status: ACTIVE | Noted: 2020-10-01

## 2022-03-19 PROBLEM — E66.01 SEVERE OBESITY (HCC): Status: ACTIVE | Noted: 2020-10-01

## 2022-03-19 PROBLEM — E03.9 HYPOTHYROIDISM: Status: ACTIVE | Noted: 2020-10-01

## 2022-03-19 PROBLEM — Z87.898 HISTORY OF VERTIGO: Status: ACTIVE | Noted: 2021-02-28

## 2022-03-19 PROBLEM — Z86.69 HISTORY OF MIGRAINE: Status: ACTIVE | Noted: 2021-02-28

## 2022-03-19 PROBLEM — Z88.9 H/O MULTIPLE ALLERGIES: Status: ACTIVE | Noted: 2021-02-17

## 2022-03-19 PROBLEM — E78.2 MIXED HYPERLIPIDEMIA: Status: ACTIVE | Noted: 2021-02-17

## 2022-03-20 PROBLEM — G47.00 INSOMNIA: Status: ACTIVE | Noted: 2021-02-28

## 2022-04-11 DIAGNOSIS — K21.9 GASTROESOPHAGEAL REFLUX DISEASE WITHOUT ESOPHAGITIS: ICD-10-CM

## 2022-04-11 RX ORDER — OMEPRAZOLE 20 MG/1
CAPSULE, DELAYED RELEASE ORAL
Qty: 90 CAPSULE | Refills: 0 | Status: SHIPPED | OUTPATIENT
Start: 2022-04-11 | End: 2022-07-09

## 2022-04-11 RX ORDER — LEVOTHYROXINE SODIUM 112 UG/1
TABLET ORAL
Qty: 30 TABLET | Refills: 0 | Status: SHIPPED | OUTPATIENT
Start: 2022-04-11 | End: 2022-05-19

## 2022-04-11 NOTE — TELEPHONE ENCOUNTER
Will give one month's supply on thyroid medication for now but future refills will require an appt, she should be seen every 6 months

## 2022-04-25 ENCOUNTER — OFFICE VISIT (OUTPATIENT)
Dept: FAMILY MEDICINE CLINIC | Age: 52
End: 2022-04-25
Payer: COMMERCIAL

## 2022-04-25 VITALS
HEART RATE: 62 BPM | OXYGEN SATURATION: 94 % | BODY MASS INDEX: 35.5 KG/M2 | DIASTOLIC BLOOD PRESSURE: 78 MMHG | RESPIRATION RATE: 16 BRPM | WEIGHT: 247.4 LBS | TEMPERATURE: 97.6 F | SYSTOLIC BLOOD PRESSURE: 117 MMHG

## 2022-04-25 DIAGNOSIS — I10 ESSENTIAL HYPERTENSION: Primary | ICD-10-CM

## 2022-04-25 DIAGNOSIS — E66.01 SEVERE OBESITY (HCC): ICD-10-CM

## 2022-04-25 DIAGNOSIS — Z23 ENCOUNTER FOR IMMUNIZATION: ICD-10-CM

## 2022-04-25 DIAGNOSIS — Z82.49 FAMILY HISTORY OF AORTIC ANEURYSM: ICD-10-CM

## 2022-04-25 DIAGNOSIS — K21.9 GASTROESOPHAGEAL REFLUX DISEASE WITHOUT ESOPHAGITIS: ICD-10-CM

## 2022-04-25 DIAGNOSIS — G47.00 INSOMNIA, UNSPECIFIED TYPE: ICD-10-CM

## 2022-04-25 DIAGNOSIS — E03.9 ACQUIRED HYPOTHYROIDISM: ICD-10-CM

## 2022-04-25 DIAGNOSIS — R00.2 PALPITATIONS: ICD-10-CM

## 2022-04-25 DIAGNOSIS — E78.2 MIXED HYPERLIPIDEMIA: ICD-10-CM

## 2022-04-25 DIAGNOSIS — R07.9 CHEST PAIN, UNSPECIFIED TYPE: ICD-10-CM

## 2022-04-25 PROCEDURE — 90750 HZV VACC RECOMBINANT IM: CPT | Performed by: NURSE PRACTITIONER

## 2022-04-25 PROCEDURE — 99214 OFFICE O/P EST MOD 30 MIN: CPT | Performed by: NURSE PRACTITIONER

## 2022-04-25 PROCEDURE — 90471 IMMUNIZATION ADMIN: CPT | Performed by: NURSE PRACTITIONER

## 2022-04-25 RX ORDER — TRAZODONE HYDROCHLORIDE 50 MG/1
50 TABLET ORAL
Qty: 30 TABLET | Refills: 0 | Status: SHIPPED | OUTPATIENT
Start: 2022-04-25 | End: 2022-07-13

## 2022-04-25 NOTE — PROGRESS NOTES
Chief Complaint   Patient presents with    Hypertension       1. \"Have you been to the ER, urgent care clinic since your last visit? Hospitalized since your last visit? \" No    2. \"Have you seen or consulted any other health care providers outside of the [address] [place] [address] since your last visit? \" No     3. For patients aged 39-70: Has the patient had a colonoscopy / FIT/ Cologuard? No      If the patient is female:    4. For patients aged 41-77: Has the patient had a mammogram within the past 2 years? YES      5. For patients aged 21-65: Has the patient had a pap smear? No      Identified pt with two pt identifiers(name and ). Reviewed record in preparation for visit and have obtained necessary documentation.     Symptom review:    NO  Fever   NO  Shaking chills  NO  Cough  NO Headaches  NO  Body aches  NO  Coughing up blood  NO  Chest congestion  NO  Chest pain  NO  Shortness of breath  NO  Profound Loss of smell/taste  NO  Nausea/Vomiting   NO  Loose stool/Diarrhea  NO  any skin issues [Negative] : Heme/Lymph

## 2022-04-25 NOTE — PROGRESS NOTES
After obtaining consent, and per orders of NP Stone, injection of Shingrix given by Nicci Nesbitt LPN. Patient instructed to remain in clinic for 20 minutes afterwards, and to report any adverse reaction to me immediately.

## 2022-04-25 NOTE — PROGRESS NOTES
Subjective:     No chief complaint on file. Maeve Gallo is a 47 y.o. female who presents today to follow up for hypothyroidism, GERD, and hypertension. She works as a  in a [employer]. She moved to [facility] about 2.5 years ago from [place] to be closer to her daughter. She has a new grandchild that was born in November. She now has 2 grandsons. Hypertension  She takes losartan 25 mg daily. BP is at goal today. Last year she reported intermittent chest pain, SOB, and palpitations x 6 months. An EKG was done and showed NSR. Lytes were normal. She was referred to cardiologist Dr Jermaine Ayon who ordered a Holter monitor. It showed PAC's, no concerning arrhythmias. He also ordered an ECHO and stress test but these were never completed. She was worried about the cost and could not take a day off work. Today she states the symptoms have not gotten any better or any worse. She just \"ignores it. \"  She is going to try to schedule at one of the other hospitals that might be able to accommodate her work schedule. Obesity  She gained 30 pounds last year but then lost 10 pounds with diet and exercise. After she lost the 10 pounds she noticed her SOB improved. Today she has lost another 3 pounds. GERD  Symptoms well controlled with omeprazole 20 mg daily. Hypothyroidism  Lab Results   Component Value Date/Time    TSH 1.94 11/12/2021 01:19 AM     She is on Synthroid 100 mcg daily. HLD  Lab Results   Component Value Date/Time    Cholesterol, total 221 (H) 09/17/2021 08:56 AM    HDL Cholesterol 43 09/17/2021 08:56 AM    LDL, calculated 140.4 (H) 09/17/2021 08:56 AM    VLDL, calculated 37.6 09/17/2021 08:56 AM    Triglyceride 188 (H) 09/17/2021 08:56 AM    CHOL/HDL Ratio 5.1 (H) 09/17/2021 08:56 AM     Dr Jermaine Ayon told her to cont taking her fish oil pill for now and work on diet and exercise which she has been doing. FH of aneurisms  Her sister has a brain aneurism.  Her brother has an aneurism in his brain, heart, and aorta. .   Their dad and paternal grandmother both had hemorrhagic strokes. At the last OV she was requesting to be checked for aneurisms. An MRA of the brain was ordered to screen for brain aneurism and an abdominal ultrasound to screen for AAA was ordered but never completed, she was worried about the cost.    Allergies  Has multiple environmental allergies and uses Azelastine NS. Migraines  She has a hx of migraine headaches that started when she was 22years old. They tend to come in \"clusters. \" has not had any recently. She stopped taking Melatonin at night for insomnia. It was making her feel groggy the next morning. Would like to try something else. She takes a magnesium pill at night for constipation. Health maintenance  PAP- She has had a hysterectomy. Mammo- done 1/2022 (normal) There is a family history of breast cancer in her sister, diagnosed at age 36  Colonoscopy- she is interested but we will first get the cardiac work up as this will most likely be a requirement prior to undergoing anesthesia. Shingrix- she is interested. 1st shot given today. Covid 19 vaccine- she has had both Moderna vaccines.  Boosted in        Patient Active Problem List   Diagnosis Code    Severe obesity ([facility] [address] E66.01    Essential hypertension I10    Hypothyroidism E03.9    Gastroesophageal reflux disease without esophagitis K21.9    H/O multiple allergies Z88.9    Palpitations R00.2    Mixed hyperlipidemia E78.2    Insomnia G47.00    History of migraine Z86.69    History of vertigo Z87.898       Past Medical History:   Diagnosis Date    Dizziness     Dyslipidemia     GERD (gastroesophageal reflux disease)     HTN (hypertension)     Hypothyroidism     Migraines     Obesity     Vertigo          Current Outpatient Medications:     Synthroid 112 mcg tablet, TAKE 1 TABLET DAILY BEFORE BREAKFAST, Disp: 30 Tablet, Rfl: 0    omeprazole (PRILOSEC) 20 mg capsule, TAKE 1 CAPSULE DAILY -GENERIC FOR PRILOSEC, Disp: 90 Capsule, Rfl: 0    ibuprofen (MOTRIN) 800 mg tablet, Take 1 Tablet by mouth every six (6) hours as needed for Pain., Disp: 60 Tablet, Rfl: 0    losartan (Cozaar) 25 mg tablet, TAKE 1 TABLET DAILY, Disp: 90 Tablet, Rfl: 3    omega 3-dha-epa-fish oil (Fish Oil) 100-160-1,000 mg cap, Take  by mouth., Disp: , Rfl:     magnesium 250 mg tab, Take  by mouth., Disp: , Rfl:     cholecalciferol (VITAMIN D3) (2,000 UNITS /50 MCG) cap capsule, Take  by mouth daily. , Disp: , Rfl:     azelastine (ASTEPRO) 0.15 % (205.5 mcg), 1 Spray by Both Nostrils route two (2) times a day., Disp: 1 Bottle, Rfl: 5    No Known Allergies    Past Surgical History:   Procedure Laterality Date    HX BREAST REDUCTION Bilateral     4 years ago    HX HYSTERECTOMY         Social History     Tobacco Use   Smoking Status Former Smoker    Packs/day: 1.50    Quit date:     Years since quittin.3   Smokeless Tobacco Never Used       Social History     Socioeconomic History    Marital status:    Tobacco Use    Smoking status: Former Smoker     Packs/day: 1.50     Quit date:      Years since quittin.3    Smokeless tobacco: Never Used   Substance and Sexual Activity    Alcohol use: Yes     Comment: wine    Drug use: Never       Family History   Problem Relation Age of Onset    Breast Cancer Sister         age at dx 55    Diabetes Sister     Diabetes Father        ROS:  Gen: denies fever, chills, or fatigue   HEENT:+hx of migraines denies nasal congestion, ear pain, or sore throat  Resp: + chronic [place] denies cough, or wheezing  CV: +intermittent chest pain and palpitations  Extremeties: denies edema  GI[de-identified] denies abdominal pain, nausea, vomiting, or diarrhea, + constipation  Musculoskeletal: no joint pain, stiffness, or muscle cramps  Neuro: denies numbness/tingling +occas vertigo   Endo: denies polyuria or polydipsia, +hot flashes  Skin: denies rashes or new lesions   Psych: +insomnia denies anxiety or depression    Objective:     Visit Vitals  /78 (BP 1 Location: Left arm)   Pulse 62   Temp 97.6 °F (36.4 °C) (Oral)   Resp 16   Wt 247 lb 6.4 oz (112.2 kg)   SpO2 94%   BMI 35.50 kg/m²       General: Alert and oriented. No acute distress. +overweight  HEENT :  Eyes: Sclera white, conjunctiva clear. PERRLA. Extra ocular movements intact. Neck: Supple with FROM. Lungs: Breathing even and unlabored. All lobes clear to auscultation bilaterally   Heart :RRR, S1 and S2 normal intensity, no extra heart sounds  Extremities: Non-edematous  Neuro: Cranial nerves grossly normal.  Psych: Mood and thought content appropriate for situation. Dressed appropriately and with good hygiene. Skin: Warm, dry, and intact. No lesions or discoloration. Assessment/ Plan:     HTN  BP at goal  Cont Losartan  Low-sodium diet  Exercise  RTO or go to ER for any CP, SOB, dizziness, or swelling. Palpitations, chest pain, and SOB  She is going to call one of the [facility] to schedule ECHO and Stress test   Will need work up prior to colonoscopy  Go to ER if symptoms worsen    Hypothyroidism  Cont Synthroid  Will check TSH next visit    GERD  Cont PPI    HLD  Will recheck lipids next visit  Cont fish oil  Low fat diet  Exercise  F/U 6 months    Obesity  Cont to work on weight loss with diet and exercise    Insomnia  Try Trazodone 50mg qHS- s/e reviewed  Advised to send me a iversity message in a few weeks to let me know how she is doing on it    Family history of aortic aneurysm  Recommended keeping BP and cholesterol under tight control  Go to ER for sudden CP, SOB, severe headache or abd pain    Health maintenance  PAP- She has had a hysterectomy.   Mammo- done 1/2022 (normal) There is a family history of breast cancer in her sister, diagnosed at age 36  Colonoscopy- she is interested but we will first get the cardiac work up as this will most likely be a requirement prior to undergoing anesthesia. Shingrix- she is interested. 1st shot given today. Covid 19 vaccine- she has had both Moderna vaccines. Boosted in        No orders of the defined types were placed in this encounter. Verbal and written instructions (see AVS) provided.  Patient expresses understanding of diagnosis and treatment plan. Health Maintenance Due   Topic Date Due    Colorectal Cancer Screening Combo  Never done    Shingrix Vaccine Age 50> (1 of 2) Never done    COVID-19 Vaccine (3 - Booster for Moderna series) 10/05/2021               Bryce Sober.  Luigi Jimenez, REYMUNDO

## 2022-06-24 ENCOUNTER — CLINICAL SUPPORT (OUTPATIENT)
Dept: FAMILY MEDICINE CLINIC | Age: 52
End: 2022-06-24
Payer: COMMERCIAL

## 2022-06-24 DIAGNOSIS — Z23 ENCOUNTER FOR IMMUNIZATION: Primary | ICD-10-CM

## 2022-06-24 PROCEDURE — 90750 HZV VACC RECOMBINANT IM: CPT | Performed by: NURSE PRACTITIONER

## 2022-06-24 PROCEDURE — 90471 IMMUNIZATION ADMIN: CPT | Performed by: NURSE PRACTITIONER

## 2022-06-24 NOTE — PROGRESS NOTES
After obtaining consent, and per orders of NP Stone, injection of Shingrix given by Peña Alanis LPN. Patient instructed to remain in clinic for 10 minutes afterwards, and to report any adverse reaction to me immediately.

## 2022-07-13 ENCOUNTER — VIRTUAL VISIT (OUTPATIENT)
Dept: FAMILY MEDICINE CLINIC | Age: 52
End: 2022-07-13
Payer: COMMERCIAL

## 2022-07-13 ENCOUNTER — DOCUMENTATION ONLY (OUTPATIENT)
Dept: FAMILY MEDICINE CLINIC | Age: 52
End: 2022-07-13

## 2022-07-13 DIAGNOSIS — U07.1 COVID-19: Primary | ICD-10-CM

## 2022-07-13 PROCEDURE — 99441 PR PHYS/QHP TELEPHONE EVALUATION 5-10 MIN: CPT | Performed by: NURSE PRACTITIONER

## 2022-07-13 RX ORDER — ALBUTEROL SULFATE 90 UG/1
2 AEROSOL, METERED RESPIRATORY (INHALATION)
Qty: 18 G | Refills: 0 | Status: SHIPPED | OUTPATIENT
Start: 2022-07-13

## 2022-07-13 NOTE — PROGRESS NOTES
1. \"Have you been to the ER, urgent care clinic since your last visit? Hospitalized since your last visit? \" No    2. \"Have you seen or consulted any other health care providers outside of the [address] [place] [address] since your last visit? \" No     Chief Complaint   Patient presents with    Positive For Covid-19     7/12/21 positive in am    Cough    Fever

## 2022-07-13 NOTE — PROGRESS NOTES
Pat Fonseca is a 47 y.o. female evaluated via telephone on 7/13/2022. Consent:  She and/or health care decision maker is aware that that she may receive a bill for this telephone service, depending on her insurance coverage, and has provided verbal consent to proceed: Yes     The patient was at home during the visit. I, the provider, was at the office during this visit. CC: Covid     HPI  Ms. Raphael Saleh is a 55yo female who presents today via telephone visit with c/o Covid infection. States she tested positive yesterday. Symptoms started 2 days ago. She reports a fever (101.2 last night), chills, chest tightness, cough, body aches, headaches, runny nose, and sore throat. She missed work yesterday. Needs a work excuse. She has no underlying lung disease but is severely obese. PLAN  Covid 19  Script sent for Paxlovid- take as directed  Start Albuterol inhaler- 2 puffs q 4 hours as needed for chest tightness or SOB  Advised to go to ER for CP or SOB not relieved by Albuterol  Cont OTC Tylenol or Ibuprofen for pain or fever  Increase fluids  Pt instructed to quarantine for 5 days starting after the date of symptom onset. If symptoms resolve at day 5 she may come out of quarantine but need to wear mask everywhere they go for the next 5 days. If symptoms have not resolved on day 5 she should quarantine for another 5 days and may not come out of quarantine until fever free. Pt veralized understanding  Work excuse given through 7-15-22, if she needs to take off another 5 days we will give another work note next week. F/U prn        Documentation:  I communicated with the patient and/or health care decision maker about the plan of care as noted above. I affirm this is a Patient Initiated Episode with an Established Patient who has not had a related appointment within my department in the past 7 days or scheduled within the next 24 hours.     Total Time: minutes: 5-10 minutes    Note: not billable if this call serves to triage the patient into an appointment for the relevant concern      Linus Bowser NP

## 2022-07-13 NOTE — LETTER
7/13/2022 9:58 AM    Ms. Callum Dexter  [facility] [address]      To Whom It May Concern:    Ms. Renetta Dumont is a patient under my care. She has tested positive for Covid 19. Symptoms started on Monday 7-11-22. She will need to quarantine for 5 days starting after the date of symptom onset. If symptoms resolve at day 5 they may come out of quarantine but need to wear mask everywhere she goes for the next 5 days. If symptoms have not resolved on day 5, she will need to quarantine for another 5 days and may not come out of quarantine until fever free. Please excuse her from work 7-11-22 through 7-15-22. She may return to work Saturday 7-16-22 if she is feeling better.             Sincerely,      Linus Bowser NP

## 2022-08-15 ENCOUNTER — TELEPHONE (OUTPATIENT)
Dept: FAMILY MEDICINE CLINIC | Age: 52
End: 2022-08-15

## 2022-08-15 ENCOUNTER — NURSE TRIAGE (OUTPATIENT)
Dept: OTHER | Facility: CLINIC | Age: 52
End: 2022-08-15

## 2022-08-15 RX ORDER — PREDNISONE 10 MG/1
10 TABLET ORAL SEE ADMIN INSTRUCTIONS
Qty: 21 TABLET | Refills: 0 | OUTPATIENT
Start: 2022-08-15 | End: 2022-08-24

## 2022-08-15 NOTE — TELEPHONE ENCOUNTER
Received call from Tawanda Chavez at [facility] with Red Flag Complaint. Subjective: Caller states \"Cough, headache, and fatigue\"     Current Symptoms:   Productive cough of green/yellow sputum  Chest pain with coughing  Sinus pressure in the forehead and cheeks  Sore throat  Hoarse voice  SOB upon exertion  Chest tightness  Wheezing  Pain in the upper right back with deep breathing  Intermittent chills  Fatigue    Tested positive for Covid on 7/12/22. Onset: 1 week ago    Pain Severity: 4/10; intermittent    Temperature: Denies    What has been tried: Albuterol inhaler, Alyse [place] Cold Medicine, cough drops    Recommended disposition: Go to Office Now    Care advice provided, patient verbalizes understanding; denies any other questions or concerns; instructed to call back for any new or worsening symptoms. Patient/Caller agrees with recommended disposition; writer provided warm transfer to [place] at [facility] for appointment scheduling    Attention Provider: Thank you for allowing me to participate in the care of your patient. The patient was connected to triage in response to information provided to the [facility]. Please do not respond through this encounter as the response is not directed to a shared pool.   Reason for Disposition   MILD difficulty breathing (e.g., minimal/no SOB at rest, SOB with walking, pulse <100) and still present when not coughing    Protocols used: Cough-ADULT-OH

## 2022-08-15 NOTE — TELEPHONE ENCOUNTER
Pt is still not feeling better after taking the Paxlovid symptoms have returned and are continuing to get worse no fever but SOB and coughing body aches sore throat and she was out of work over the weekend and was wondering if you would be able to write her a note for work for the dates 13th, 14th and possibly 16th if she is not feeling better? Can you send the prednisone to [address] [facility] in [place]?

## 2022-08-15 NOTE — TELEPHONE ENCOUNTER
Script for prednisone has been sent. Work note typed up, she can print it from [employer] or  a copy from the .

## 2022-08-15 NOTE — LETTER
8/15/2022 1:23 PM    Ms. Jacob Mayo  [address]      To Whom It May Concern:    Ms. Shun Elizabeth is a patient under my care. Please excuse her from work 8/13, 8/14, and 8/16. She may return to work 8/17/22 if she is feeling better.             Sincerely,      Araceli Calvert NP

## 2022-08-24 ENCOUNTER — APPOINTMENT (OUTPATIENT)
Dept: GENERAL RADIOLOGY | Age: 52
End: 2022-08-24
Attending: EMERGENCY MEDICINE
Payer: COMMERCIAL

## 2022-08-24 ENCOUNTER — HOSPITAL ENCOUNTER (EMERGENCY)
Age: 52
Discharge: HOME | End: 2022-08-24
Attending: EMERGENCY MEDICINE
Payer: COMMERCIAL

## 2022-08-24 VITALS
DIASTOLIC BLOOD PRESSURE: 73 MMHG | HEART RATE: 70 BPM | TEMPERATURE: 97.7 F | RESPIRATION RATE: 20 BRPM | OXYGEN SATURATION: 99 % | WEIGHT: 247 LBS | HEIGHT: 70 IN | BODY MASS INDEX: 35.36 KG/M2 | SYSTOLIC BLOOD PRESSURE: 139 MMHG

## 2022-08-24 DIAGNOSIS — J20.9 ACUTE BRONCHITIS, UNSPECIFIED ORGANISM: ICD-10-CM

## 2022-08-24 DIAGNOSIS — R06.02 SOB (SHORTNESS OF BREATH): Primary | ICD-10-CM

## 2022-08-24 LAB
ANION GAP SERPL CALC-SCNC: 7 MMOL/L (ref 5–15)
BASOPHILS # BLD: 0.1 K/UL (ref 0–0.1)
BASOPHILS NFR BLD: 1 % (ref 0–1)
BUN SERPL-MCNC: 13 MG/DL (ref 6–20)
BUN/CREAT SERPL: 13 (ref 12–20)
CALCIUM SERPL-MCNC: 9.2 MG/DL (ref 8.5–10.1)
CHLORIDE SERPL-SCNC: 101 MMOL/L (ref 97–108)
CO2 SERPL-SCNC: 31 MMOL/L (ref 21–32)
CREAT SERPL-MCNC: 1.01 MG/DL (ref 0.55–1.02)
DIFFERENTIAL METHOD BLD: ABNORMAL
EOSINOPHIL # BLD: 0.4 K/UL (ref 0–0.4)
EOSINOPHIL NFR BLD: 5 % (ref 0–7)
ERYTHROCYTE [DISTWIDTH] IN BLOOD BY AUTOMATED COUNT: 13.6 % (ref 11.5–14.5)
GLUCOSE SERPL-MCNC: 93 MG/DL (ref 65–100)
HCT VFR BLD AUTO: 42.9 % (ref 35–47)
HGB BLD-MCNC: 13.9 G/DL (ref 11.5–16)
IMM GRANULOCYTES # BLD AUTO: 0.1 K/UL (ref 0–0.04)
IMM GRANULOCYTES NFR BLD AUTO: 1 % (ref 0–0.5)
LYMPHOCYTES # BLD: 3.6 K/UL (ref 0.8–3.5)
LYMPHOCYTES NFR BLD: 38 % (ref 12–49)
MCH RBC QN AUTO: 28.6 PG (ref 26–34)
MCHC RBC AUTO-ENTMCNC: 32.4 G/DL (ref 30–36.5)
MCV RBC AUTO: 88.3 FL (ref 80–99)
MONOCYTES # BLD: 0.7 K/UL (ref 0–1)
MONOCYTES NFR BLD: 8 % (ref 5–13)
NEUTS SEG # BLD: 4.5 K/UL (ref 1.8–8)
NEUTS SEG NFR BLD: 47 % (ref 32–75)
NRBC # BLD: 0 K/UL (ref 0–0.01)
NRBC BLD-RTO: 0 PER 100 WBC
PLATELET # BLD AUTO: 311 K/UL (ref 150–400)
PMV BLD AUTO: 9.9 FL (ref 8.9–12.9)
POTASSIUM SERPL-SCNC: 4.4 MMOL/L (ref 3.5–5.1)
RBC # BLD AUTO: 4.86 M/UL (ref 3.8–5.2)
SODIUM SERPL-SCNC: 139 MMOL/L (ref 136–145)
TROPONIN-HIGH SENSITIVITY: 12 NG/L (ref 0–51)
WBC # BLD AUTO: 9.5 K/UL (ref 3.6–11)

## 2022-08-24 PROCEDURE — 93005 ELECTROCARDIOGRAM TRACING: CPT

## 2022-08-24 PROCEDURE — 84484 ASSAY OF TROPONIN QUANT: CPT

## 2022-08-24 PROCEDURE — 80048 BASIC METABOLIC PNL TOTAL CA: CPT

## 2022-08-24 PROCEDURE — 36415 COLL VENOUS BLD VENIPUNCTURE: CPT

## 2022-08-24 PROCEDURE — 85025 COMPLETE CBC W/AUTO DIFF WBC: CPT

## 2022-08-24 PROCEDURE — 71046 X-RAY EXAM CHEST 2 VIEWS: CPT

## 2022-08-24 PROCEDURE — 99285 EMERGENCY DEPT VISIT HI MDM: CPT

## 2022-08-24 RX ORDER — PREDNISONE 10 MG/1
TABLET ORAL
Qty: 48 TABLET | Refills: 0 | Status: SHIPPED | OUTPATIENT
Start: 2022-08-24 | End: 2022-10-05 | Stop reason: ALTCHOICE

## 2022-08-24 NOTE — ED TRIAGE NOTES
Pt reports URI, Covid since July. Pt reports that she is very SOB with chest tightness. She feels as if steroids, abx and rescue inhalers are not resolving. Strong, congested cough present.

## 2022-08-24 NOTE — ED PROVIDER NOTES
EMERGENCY DEPARTMENT HISTORY AND PHYSICAL EXAM      Date: 8/24/2022  Patient Name: Zach Wise    History of Presenting Illness     Chief Complaint   Patient presents with    Shortness of Breath       History Provided By: Patient    HPI: Zach Wise, 47 y.o. female with PMHx significant for hypertension, GERD, hypothyroid, presents ambulatory to the ED with cc of cough and shortness of breath. Patient reports she tested positive for COVID on July 12. She did take Paxlovid. She was also started on an albuterol inhaler. She slowly recovered from [facility] but she states around August 15 she began to get worse again with worsening cough and shortness of breath. Her PCP sent in a prescription for prednisone with no significant improvement. States she was most recently seen in the urgent care last week they put her on Augmentin and albuterol. She reports no significant improvement in her shortness of breath and dyspnea on exertion. She denies any chest pain. No leg pain or swelling. No recent plane travel. Denies any fevers. States what is bothering her most is this persistent cough and shortness of breath. She does report symptomatic improvement with steroids in the past.      PMHx: Significant for hypertension, GERD, hypothyroid  PSHx: Significant for hysterectomy, bilateral breast reduction  Social Hx: Former cigarette smoker. 1 pack to 1-1/2 packs a day for 10 years. Quit in 2000. Occasionally drinks a glass of wine. There are no other complaints, changes, or physical findings at this time. PCP: Amanda Breaux NP    No current facility-administered medications on file prior to encounter. Current Outpatient Medications on File Prior to Encounter   Medication Sig Dispense Refill    [DISCONTINUED] predniSONE (STERAPRED DS) 10 mg dose pack Take 1 Tablet by mouth See Admin Instructions.  See administration instruction per 10mg dose pack 21 Tablet 0    albuterol (PROVENTIL HFA, VENTOLIN HFA, PROAIR HFA) 90 mcg/actuation inhaler Take 2 Puffs by inhalation every four (4) hours as needed for Shortness of Breath (chest tightness). 18 g 0    Synthroid 112 mcg tablet TAKE 1 TABLET DAILY BEFORE BREAKFAST 90 Tablet 0    losartan (Cozaar) 25 mg tablet TAKE 1 TABLET DAILY 90 Tablet 0    omeprazole (PRILOSEC) 20 mg capsule TAKE 1 CAPSULE DAILY -GENERIC FOR PRILOSEC 90 Capsule 0    ibuprofen (MOTRIN) 800 mg tablet Take 1 Tablet by mouth every six (6) hours as needed for Pain. 60 Tablet 0    omega 3-dha-epa-fish oil (Fish Oil) 100-160-1,000 mg cap Take  by mouth.      magnesium 250 mg tab Take  by mouth. cholecalciferol (VITAMIN D3) (2,000 UNITS /50 MCG) cap capsule Take  by mouth daily. azelastine (ASTEPRO) 0.15 % (205.5 mcg) 1 Spray by Both Nostrils route two (2) times a day. 1 Bottle 5       Past History     Past Medical History:  Past Medical History:   Diagnosis Date    Dizziness     Dyslipidemia     GERD (gastroesophageal reflux disease)     HTN (hypertension)     Hypothyroidism     Migraines     Obesity     Vertigo        Past Surgical History:  Past Surgical History:   Procedure Laterality Date    HX BREAST REDUCTION Bilateral     4 years ago    HX HYSTERECTOMY         Family History:  Family History   Problem Relation Age of Onset    Breast Cancer Sister         age at dx 55    Diabetes Sister     Diabetes Father        Social History:  Social History     Tobacco Use    Smoking status: Former     Packs/day: 1.50     Years: 10.00     Pack years: 15.00     Types: Cigarettes     Quit date:      Years since quittin.6    Smokeless tobacco: Never   Vaping Use    Vaping Use: Never used   Substance Use Topics    Alcohol use: Yes     Comment: wine    Drug use: Never       Allergies:  No Known Allergies      Review of Systems   Review of Systems   Constitutional:  Negative for activity change, chills and fever. HENT:  Negative for congestion and sore throat. Eyes:  Negative for pain and redness. Respiratory:  Positive for cough and shortness of breath. Negative for chest tightness. Cardiovascular:  Negative for chest pain and palpitations. Gastrointestinal:  Negative for abdominal pain, diarrhea, nausea and vomiting. Genitourinary:  Negative for dysuria, frequency and urgency. Musculoskeletal:  Negative for back pain and neck pain. Skin:  Negative for rash. Neurological:  Negative for syncope, light-headedness and headaches. Psychiatric/Behavioral:  Negative for confusion. All other systems reviewed and are negative. Physical Exam   Physical Exam  Vitals and nursing note reviewed. Constitutional:       General: She is not in acute distress. Appearance: She is well-developed. She is not diaphoretic. HENT:      Head: Normocephalic. Nose: Nose normal.      Mouth/Throat:      Pharynx: No oropharyngeal exudate. Eyes:      General: No scleral icterus. Conjunctiva/sclera: Conjunctivae normal.      Pupils: Pupils are equal, round, and reactive to light. Neck:      Thyroid: No thyromegaly. Vascular: No JVD. Trachea: No tracheal deviation. Cardiovascular:      Rate and Rhythm: Normal rate and regular rhythm. Heart sounds: No murmur heard. No friction rub. No gallop. Pulmonary:      Effort: Pulmonary effort is normal. No respiratory distress. Breath sounds: Normal breath sounds. No stridor. No wheezing or rales. Abdominal:      General: Bowel sounds are normal. There is no distension. Palpations: Abdomen is soft. Tenderness: There is no abdominal tenderness. There is no guarding or rebound. Musculoskeletal:         General: Normal range of motion. Cervical back: Normal range of motion and neck supple. Right lower leg: No tenderness. No edema. Left lower leg: No tenderness. No edema. Lymphadenopathy:      Cervical: No cervical adenopathy. Skin:     General: Skin is warm and dry. Findings: No erythema or rash. Neurological:      Mental Status: She is alert and oriented to person, place, and time. Cranial Nerves: No cranial nerve deficit. Motor: No abnormal muscle tone. Coordination: Coordination normal.   Psychiatric:         Behavior: Behavior normal.           Diagnostic Study Results     Labs -     Recent Results (from the past 12 hour(s))   CBC WITH AUTOMATED DIFF    Collection Time: 08/24/22 11:00 AM   Result Value Ref Range    WBC 9.5 3.6 - 11.0 K/uL    RBC 4.86 3.80 - 5.20 M/uL    HGB 13.9 11.5 - 16.0 g/dL    HCT 42.9 35.0 - 47.0 %    MCV 88.3 80.0 - 99.0 FL    MCH 28.6 26.0 - 34.0 PG    MCHC 32.4 30.0 - 36.5 g/dL    RDW 13.6 11.5 - 14.5 %    PLATELET 178 847 - 963 K/uL    MPV 9.9 8.9 - 12.9 FL    NRBC 0.0 0  WBC    ABSOLUTE NRBC 0.00 0.00 - 0.01 K/uL    NEUTROPHILS 47 32 - 75 %    LYMPHOCYTES 38 12 - 49 %    MONOCYTES 8 5 - 13 %    EOSINOPHILS 5 0 - 7 %    BASOPHILS 1 0 - 1 %    IMMATURE GRANULOCYTES 1 (H) 0.0 - 0.5 %    ABS. NEUTROPHILS 4.5 1.8 - 8.0 K/UL    ABS. LYMPHOCYTES 3.6 (H) 0.8 - 3.5 K/UL    ABS. MONOCYTES 0.7 0.0 - 1.0 K/UL    ABS. EOSINOPHILS 0.4 0.0 - 0.4 K/UL    ABS. BASOPHILS 0.1 0.0 - 0.1 K/UL    ABS. IMM. GRANS. 0.1 (H) 0.00 - 0.04 K/UL    DF AUTOMATED     METABOLIC PANEL, BASIC    Collection Time: 08/24/22 11:00 AM   Result Value Ref Range    Sodium 139 136 - 145 mmol/L    Potassium 4.4 3.5 - 5.1 mmol/L    Chloride 101 97 - 108 mmol/L    CO2 31 21 - 32 mmol/L    Anion gap 7 5 - 15 mmol/L    Glucose 93 65 - 100 mg/dL    BUN 13 6 - 20 MG/DL    Creatinine 1.01 0.55 - 1.02 MG/DL    BUN/Creatinine ratio 13 12 - 20      GFR est AA >60 >60 ml/min/1.73m2    GFR est non-AA 58 (L) >60 ml/min/1.73m2    Calcium 9.2 8.5 - 10.1 MG/DL   TROPONIN-HIGH SENSITIVITY    Collection Time: 08/24/22 11:00 AM   Result Value Ref Range    Troponin-High Sensitivity 12 0 - 51 ng/L       Radiologic Studies -   XR CHEST PA LAT   Final Result   No acute cardiopulmonary disease.            CT Results (Last 48 hours)      None          CXR Results  (Last 48 hours)                 08/24/22 1040  XR CHEST PA LAT Final result    Impression:  No acute cardiopulmonary disease. Narrative: Indication: Shortness of breath. Exam: PA and lateral views of the chest.       There is no prior study for direct comparison. Findings: Cardiomediastinal silhouette is within normal limits. Lungs are clear   bilaterally. Pleural spaces are normal. Osseous structures are intact. Medical Decision Making   I am the first provider for this patient. I reviewed the vital signs, available nursing notes, past medical history, past surgical history, family history and social history. Vital Signs-Reviewed the patient's vital signs. Patient Vitals for the past 12 hrs:   Temp Pulse Resp BP SpO2   08/24/22 1005 97.7 °F (36.5 °C) 86 20 (!) 145/102 100 %       Pulse Oximetry Analysis - 100% on RA    Cardiac Monitor:   Rate: 86 bpm  Rhythm: Normal Sinus Rhythm        ED EKG interpretation: 10:12 AM  Rhythm: normal sinus rhythm; and regular . Rate (approx.): 70; Axis: normal; TX Interval: normal; QRS interval: normal ; ST/T wave: normal; This EKG was interpreted by Compa Edgar MD,ED Provider. Records Reviewed: Nursing Notes and Old Medical Records    Provider Notes (Medical Decision Making):   DDx: ACS, pneumonia, bronchitis, COVID-19 virus infection with prolonged symptoms. ED Course:   Initial assessment performed. The patients presenting problems have been discussed, and they are in agreement with the care plan formulated and outlined with them. I have encouraged them to ask questions as they arise throughout their visit. PROGRESS NOTE    Pt reevaluated. CBC and CMP unremarkable. Negative high-sensitivity troponin. No acute EKG changes. Chest x-ray clear. Good room air sats. No tachycardia. Patient PERC negative, side of age of 47. Wells criteria negative.   No suspicion of PE.  Will discharge with prolonged prednisone taper. We will treat as acute bronchitis. Written by Nancy Fletcher MD     Progress note:    Pt noted to be feeling better , ready for discharge. Updated pt and/or family on all final lab and imaging findings. Will follow up as instructed. All questions have been answered, pt voiced understanding and agreement with plan. Specific return precautions provided as well as instructions to return to the ED should sx worsen at any time. Vital signs stable for discharge. I have also put together some discharge instructions for them that include: 1) educational information regarding their diagnosis, 2) how to care for their diagnosis at home, as well a 3) list of reasons why they would want to return to the ED prior to their follow-up appointment, should their condition change. Written by Nancy Fletcher MD        Critical Care Time:   0    Disposition:  Discharge    PLAN:  1. Current Discharge Medication List        CONTINUE these medications which have CHANGED    Details   predniSONE (STERAPRED DS) 10 mg dose pack Take as directed  Qty: 48 Tablet, Refills: 0  Start date: 8/24/2022           2. Follow-up Information       Follow up With Specialties Details Why Contact Maddi Breaux NP Nurse Practitioner Schedule an appointment as soon as possible for a visit in 1 week  [address] [place] [address]  [phone]            Return to ED if worse     Diagnosis     Clinical Impression:   1. SOB (shortness of breath)    2. Acute bronchitis, unspecified organism              Please note that this dictation was completed with [employer], the computer voice recognition software. Quite often unanticipated grammatical, syntax, homophones, and other interpretive errors are inadvertently transcribed by the computer software. Please disregard these errors. Please excuse any errors that have escaped final proofreading.

## 2022-08-25 LAB
ATRIAL RATE: 70 BPM
CALCULATED P AXIS, ECG09: 9 DEGREES
CALCULATED R AXIS, ECG10: -5 DEGREES
CALCULATED T AXIS, ECG11: 61 DEGREES
DIAGNOSIS, 93000: NORMAL
P-R INTERVAL, ECG05: 148 MS
Q-T INTERVAL, ECG07: 382 MS
QRS DURATION, ECG06: 80 MS
QTC CALCULATION (BEZET), ECG08: 412 MS
VENTRICULAR RATE, ECG03: 70 BPM

## 2022-09-09 DIAGNOSIS — K21.9 GASTROESOPHAGEAL REFLUX DISEASE WITHOUT ESOPHAGITIS: ICD-10-CM

## 2022-09-09 RX ORDER — LEVOTHYROXINE SODIUM 112 UG/1
TABLET ORAL
Qty: 90 TABLET | Refills: 0 | Status: SHIPPED | OUTPATIENT
Start: 2022-09-09

## 2022-09-09 RX ORDER — OMEPRAZOLE 20 MG/1
CAPSULE, DELAYED RELEASE ORAL
Qty: 90 CAPSULE | Refills: 0 | Status: SHIPPED | OUTPATIENT
Start: 2022-09-09

## 2022-09-16 DIAGNOSIS — I10 ESSENTIAL HYPERTENSION: ICD-10-CM

## 2022-09-16 RX ORDER — LOSARTAN POTASSIUM 25 MG/1
TABLET ORAL
Qty: 90 TABLET | Refills: 0 | Status: SHIPPED | OUTPATIENT
Start: 2022-09-16

## 2022-09-26 ENCOUNTER — NURSE TRIAGE (OUTPATIENT)
Dept: OTHER | Facility: CLINIC | Age: 52
End: 2022-09-26

## 2022-09-26 NOTE — TELEPHONE ENCOUNTER
Received call from Carlos Enrique Lara at [facility] with [facility]. Subjective: Caller states \"I have a cough fro 3 mos, I've been to [facility] twice and ER once. \"     Current Symptoms: Intermittent Cough- white to green phlegm. Has had coughing spells that made her vomit. Began in June 2022    Intermittent SOB- wheezing with exhalation. SOB with and without activity. Began in June, SOB is worse with coughing. When coughing less, SOB is less. Unable to lay when coughing and SOB worse. SOB worse with exhaling. Sometimes whistling    Intermittent Chest heaviness- feel like someone sitting on chest. Began in June    Onset: June 2022  intermittent, waxing and waning    Associated Symptoms: reduced activity, increased wakefulness    Pain Severity: 4/10; feels like someone sitting on chest.; intermittent, mild    Temperature: none     What has been tried: albuterol inhaler, antibiotic, prednisone. RX cough med- did not help. ,OTC balaji seltzer plus. LMP: NA Pregnant: NA    Recommended disposition: Go to Office Now    Care advice provided, patient verbalizes understanding; denies any other questions or concerns; instructed to call back for any new or worsening symptoms. Patient/Caller agrees with recommended disposition; writer provided warm transfer to [place] at [facility] for appointment scheduling    Attention Provider: Thank you for allowing me to participate in the care of your patient. The patient was connected to triage in response to information provided to the [facility]. Please do not respond through this encounter as the response is not directed to a shared pool.         Reason for Disposition   Patient wants to be seen    Protocols used: Breathing Difficulty-ADULT-OH

## 2022-10-04 ENCOUNTER — TELEPHONE (OUTPATIENT)
Dept: FAMILY MEDICINE CLINIC | Age: 52
End: 2022-10-04

## 2022-10-04 NOTE — TELEPHONE ENCOUNTER
The last tie this pt was seen in the er she was given Benzonatate 100 mg- Take 1 cap by mouth every 8 hours. [place] going [place] on  [place]19 and needs something for her cough.  Ptd oes have a appt wth you on 10/21    [facility]

## 2022-10-05 RX ORDER — MONTELUKAST SODIUM 10 MG/1
10 TABLET ORAL DAILY
Qty: 30 TABLET | Refills: 0 | Status: SHIPPED | OUTPATIENT
Start: 2022-10-05

## 2022-10-05 NOTE — TELEPHONE ENCOUNTER
Called patient and told her Patience Fregoso called in new medication and tessalon pearls are not recommended for long term use she said she does have appt with pulmonology in december

## 2022-10-05 NOTE — TELEPHONE ENCOUNTER
She needs to be tested for asthma and COPD. I will send in a script for 30days supply of Singulair to see if this helps and she will need to follow up when she gets back in town. It is not good to use benzonate long term.

## 2022-10-21 ENCOUNTER — OFFICE VISIT (OUTPATIENT)
Dept: FAMILY MEDICINE CLINIC | Age: 52
End: 2022-10-21
Payer: COMMERCIAL

## 2022-10-21 VITALS
OXYGEN SATURATION: 99 % | DIASTOLIC BLOOD PRESSURE: 77 MMHG | SYSTOLIC BLOOD PRESSURE: 138 MMHG | WEIGHT: 247 LBS | HEIGHT: 70 IN | BODY MASS INDEX: 35.36 KG/M2 | HEART RATE: 71 BPM | TEMPERATURE: 97 F | RESPIRATION RATE: 18 BRPM

## 2022-10-21 DIAGNOSIS — Z13.1 DIABETES MELLITUS SCREENING: ICD-10-CM

## 2022-10-21 DIAGNOSIS — E03.9 ACQUIRED HYPOTHYROIDISM: ICD-10-CM

## 2022-10-21 DIAGNOSIS — R05.3 CHRONIC COUGH: ICD-10-CM

## 2022-10-21 DIAGNOSIS — I10 ESSENTIAL HYPERTENSION: ICD-10-CM

## 2022-10-21 DIAGNOSIS — E78.2 MIXED HYPERLIPIDEMIA: Primary | ICD-10-CM

## 2022-10-21 DIAGNOSIS — K21.9 GASTROESOPHAGEAL REFLUX DISEASE WITHOUT ESOPHAGITIS: ICD-10-CM

## 2022-10-21 PROCEDURE — 99214 OFFICE O/P EST MOD 30 MIN: CPT | Performed by: NURSE PRACTITIONER

## 2022-10-21 PROCEDURE — 36415 COLL VENOUS BLD VENIPUNCTURE: CPT | Performed by: NURSE PRACTITIONER

## 2022-10-21 RX ORDER — BUDESONIDE AND FORMOTEROL FUMARATE DIHYDRATE 80; 4.5 UG/1; UG/1
2 AEROSOL RESPIRATORY (INHALATION) 2 TIMES DAILY
Qty: 10.2 G | Refills: 0 | Status: SHIPPED | OUTPATIENT
Start: 2022-10-21 | End: 2022-11-01 | Stop reason: ALTCHOICE

## 2022-10-21 RX ORDER — BENZONATATE 100 MG/1
100 CAPSULE ORAL
Qty: 30 CAPSULE | Refills: 0 | Status: SHIPPED | OUTPATIENT
Start: 2022-10-21 | End: 2022-10-28

## 2022-10-21 NOTE — PROGRESS NOTES
1. \"Have you been to the ER, urgent care clinic since your last visit? Hospitalized since your last visit? \" No    2. \"Have you seen or consulted any other health care providers outside of the [address] [place] [address] since your last visit? \" No     3. For patients aged 39-70: Has the patient had a colonoscopy / FIT/ Cologuard? No      If the patient is female:    4. For patients aged 41-77: Has the patient had a mammogram within the past 2 years? Yes - no Care Gap present      5. For patients aged 21-65: Has the patient had a pap smear?  Yes - no Care Gap present

## 2022-10-21 NOTE — PROGRESS NOTES
Subjective:     CC: HTN, HLD      Jabier Mo is a 47 y.o. female who presents today to follow up for hypertension, HLD, and other chronic medical problems. She works as a  in a [employer]. She moved to [facility] about 2.5 years ago from [place] to be closer to her daughter. She has a new grandchild that was born in November. She now has 2 grandsons. New issues: chronic cough after Covid infection 7-2022  She caught Covid in July and ever since has had a lingering productive cough with SOB. On 9/27/22 she went to the ER for the cough. Per ER report, \" chest tightness and dyspnea onset about 3 months ago. She also endorses intermittent cough, wheezing, orthopnea, and headaches. She states that she occasionally coughs up green/yellow or white mucus. Pt has tried 2 courses of antibiotics, an albuterol inhaler, and 2 rounds of medrol packs without lasting relief. She states that she has been using Dayquil, Nyquil, and acetaminophen as needed for headaches. She reports that the inhaler provides transient relief. Pt states that she has been unable to see her PCP in person and has not seen a pulmonologist.\"       Per ER note, \"Her exam  was unremarkable with the exception of expiratory rhonchi that clears with coughing and cough. Plan:   Labs ordered in triage include trop x1, cbc, bmp and chest xray. All labs unremarkable. Chest xray does not show any acute processes or infection/pna. She has been seen multiple times for these same symptoms with some symptom relief with albuterol, steroids, abx, but symptoms return when treatment ends. She is concerned today for PE as she reports a family hx of dvt. Perc score was 1, Wells 0. Heart Score 3  Will get a repeat troponin and d-dimer. If d-dimer negative will dc with referral for pulmonology and proper care. Guaifenesin and Tesselon pearls for cough. \"    Today she states her pulm appt is not until 12-1-22.  The tessalon perles do relieve the cough, and the SOB is relieved with Albuterol. She espec has to take the melissa perles at night or else she does not sleep. She reports yellow/green mucus and will often vomiting if she gets into a coughing fit. She then went to ER at [facility] and they prescribed Advair but it was $400. On her own she tried increasing her PPI to BID but this was not helpful. I had sent her in a script for Singulair but she did not start it due to fear of side effects. She has had to miss a lot of work, needs [employer] paperwork completed. She will drop it off Monday. Hypertension  She takes losartan 25 mg daily. BP is at goal today. Last year she reported intermittent chest pain, SOB, and palpitations x 6 months. An EKG was done and showed NSR. Lytes were normal. She was referred to cardiologist Dr Lucinda Meade who ordered a Holter monitor. It showed PAC's, no concerning arrhythmias. He also ordered an ECHO and stress test but these were never completed. She was worried about the cost and could not take a day off work. Today she states the symptoms have not gotten any better or any worse. She just \"ignores it. \"    Hypothyroidism  Lab Results   Component Value Date/Time    TSH 1.94 11/12/2021 01:19 AM     She is on Synthroid 100 mcg daily. Due for TSH check today    HLD  Lab Results   Component Value Date/Time    Cholesterol, total 221 (H) 09/17/2021 08:56 AM    HDL Cholesterol 43 09/17/2021 08:56 AM    LDL, calculated 140.4 (H) 09/17/2021 08:56 AM    VLDL, calculated 37.6 09/17/2021 08:56 AM    Triglyceride 188 (H) 09/17/2021 08:56 AM    CHOL/HDL Ratio 5.1 (H) 09/17/2021 08:56 AM     Dr Lucinda Meade told her to cont taking her fish oil pill for now and work on diet and exercise which she has been doing. FH of aneurisms  Her sister has a brain aneurism. Her brother has an aneurism in his brain, heart, and aorta. .   Their dad and paternal grandmother both had hemorrhagic strokes.  At the last OV she was requesting to be checked for aneurisms. An MRA of the brain was ordered to screen for brain aneurism and an abdominal ultrasound to screen for AAA was ordered but never completed, she was worried about the cost.    Allergies  Has multiple environmental allergies and uses Azelastine NS. Migraines  She has a hx of migraine headaches that started when she was 22years old. They tend to come in \"clusters. \" has not had any recently. Insomnia  At the last visit she was prescribed Trazodone 50mg q HS but did not take it, it did not help it. Taking Melatonin 2mg qHS and this helps. She takes a magnesium pill at night for constipation. Health maintenance  PAP- She has had a hysterectomy. Mammo- done 1/2022 (normal) There is a family history of breast cancer in her sister, diagnosed at age 36  Colonoscopy- she is interested but we will first get the cardiac work up as this will most likely be a requirement prior to undergoing anesthesia. Shingrix- up to date  Covid 19 vaccine- she has had both Moderna vaccines. Boosted in . Caught Covid in July  Flu shot- will hold off for now. Patient Active Problem List   Diagnosis Code    Severe obesity ([facility] [address] E66.01    Essential hypertension I10    Hypothyroidism E03.9    Gastroesophageal reflux disease without esophagitis K21.9    H/O multiple allergies Z88.9    Palpitations R00.2    Mixed hyperlipidemia E78.2    Insomnia G47.00    History of migraine Z86.69    History of vertigo Z87.898       Past Medical History:   Diagnosis Date    Dizziness     Dyslipidemia     GERD (gastroesophageal reflux disease)     HTN (hypertension)     Hypothyroidism     Migraines     Obesity     Vertigo          Current Outpatient Medications:     montelukast (SINGULAIR) 10 mg tablet, Take 1 Tablet by mouth daily. , Disp: 30 Tablet, Rfl: 0    losartan (Cozaar) 25 mg tablet, TAKE 1 TABLET DAILY, Disp: 90 Tablet, Rfl: 0    Synthroid 112 mcg tablet, TAKE 1 TABLET DAILY BEFORE BREAKFAST, Disp: 90 Tablet, Rfl: 0    omeprazole (PRILOSEC) 20 mg capsule, TAKE 1 CAPSULE DAILY -GENERIC FOR PRILOSEC, Disp: 90 Capsule, Rfl: 0    albuterol (PROVENTIL HFA, VENTOLIN HFA, PROAIR HFA) 90 mcg/actuation inhaler, Take 2 Puffs by inhalation every four (4) hours as needed for Shortness of Breath (chest tightness). , Disp: 18 g, Rfl: 0    ibuprofen (MOTRIN) 800 mg tablet, Take 1 Tablet by mouth every six (6) hours as needed for Pain., Disp: 60 Tablet, Rfl: 0    omega 3-dha-epa-fish oil (Fish Oil) 100-160-1,000 mg cap, Take  by mouth., Disp: , Rfl:     magnesium 250 mg tab, Take  by mouth., Disp: , Rfl:     cholecalciferol (VITAMIN D3) (2,000 UNITS /50 MCG) cap capsule, Take  by mouth daily. , Disp: , Rfl:     azelastine (ASTEPRO) 0.15 % (205.5 mcg), 1 Spray by Both Nostrils route two (2) times a day., Disp: 1 Bottle, Rfl: 5    No Known Allergies    Past Surgical History:   Procedure Laterality Date    HX BREAST REDUCTION Bilateral     4 years ago    HX HYSTERECTOMY         Social History     Tobacco Use   Smoking Status Former    Packs/day: 1.50    Years: 10.00    Pack years: 15.00    Types: Cigarettes    Quit date:     Years since quittin.8   Smokeless Tobacco Never       Social History     Socioeconomic History    Marital status:    Tobacco Use    Smoking status: Former     Packs/day: 1.50     Years: 10.00     Pack years: 15.00     Types: Cigarettes     Quit date:      Years since quittin.8    Smokeless tobacco: Never   Vaping Use    Vaping Use: Never used   Substance and Sexual Activity    Alcohol use: Yes     Comment: wine    Drug use: Never    Sexual activity: Not Currently       Family History   Problem Relation Age of Onset    Breast Cancer Sister         age at dx 55    Diabetes Sister     Diabetes Father        ROS:  Gen: denies fever, chills, or fatigue   HEENT:+hx of migraines denies nasal congestion, ear pain, or sore throat  Resp: + chronic MONTGOMERY +chronic productive cough, denies wheezing  CV: +intermittent chest pain and palpitations  Extremeties: denies edema  GI[de-identified] denies abdominal pain, nausea, vomiting, or diarrhea, + constipation  Musculoskeletal: no joint pain, stiffness, or muscle cramps  Neuro: denies numbness/tingling +occas vertigo   Endo: denies polyuria or polydipsia, +hot flashes  Skin: denies rashes or new lesions   Psych: +insomnia denies anxiety or depression    Objective:     Visit Vitals  /77 (BP 1 Location: Left upper arm, BP Patient Position: At rest, BP Cuff Size: Adult)   Pulse 71   Temp 97 °F (36.1 °C) (Temporal)   Resp 18   Ht 5' 10\" (1.778 m)   Wt 247 lb (112 kg)   SpO2 99%   BMI 35.44 kg/m²       General: Alert and oriented. No acute distress, +barking cough during visit. +overweight  HEENT :  Eyes: Sclera white, conjunctiva clear. PERRLA. Extra ocular movements intact. Neck: Supple with FROM. Lungs: Breathing even and unlabored. All lobes clear to auscultation bilaterally   Heart :RRR, S1 and S2 normal intensity, no extra heart sounds  Extremities: Non-edematous  Neuro: Cranial nerves grossly normal.  Psych: Mood and thought content appropriate for situation. Dressed appropriately and with good hygiene. Skin: Warm, dry, and intact. No lesions or discoloration. Assessment/ Plan:     HTN  BP at goal  Cont Losartan  Low-sodium diet  Exercise  RTO or go to ER for any CP, SOB, dizziness, or swelling. F/U 6 months    Chronic cough  Reassured her about the side effects of Singulair- advised to start now  Script sent for Symbicort - will see if insurance covers  May cont Deborah perles prn if singulair and symbicort are not effective.   PFTs ordered  Keep appt with pulm in 12-1-22  F/U here 1 month    Palpitations, chest pain, and SOB  Needs ECHO and stress test but has not scheduled them due to insurance issues    Hypothyroidism  Cont Synthroid  Check     GERD  Stable  Cont PPI    HLD  Check lipids   Cont fish oil  Low fat diet  Exercise  F/U 6 months    Obesity  Cont to work on weight loss with diet and exercise    Insomnia  Improved with melatonin    Family history of aortic aneurysm  Recommended keeping BP and cholesterol under tight control  Go to ER for sudden CP, SOB, severe headache or abd pain    Screening for diabetes  A1C ordered today per pt request  She is concerned about her blood sugar due to frequent prednisone use         No orders of the defined types were placed in this encounter. Verbal and written instructions (see AVS) provided. Patient expresses understanding of diagnosis and treatment plan. Health Maintenance Due   Topic Date Due    Colorectal Cancer Screening Combo  Never done    COVID-19 Vaccine (4 - Booster for Moderna series) 04/11/2022    Flu Vaccine (1) 08/01/2022               Beau Allan D. Camille Litten, REYMUNDO

## 2022-10-22 LAB
ALBUMIN SERPL-MCNC: 4 G/DL (ref 3.5–5)
ALBUMIN/GLOB SERPL: 1.3 {RATIO} (ref 1.1–2.2)
ALP SERPL-CCNC: 84 U/L (ref 45–117)
ALT SERPL-CCNC: 28 U/L (ref 12–78)
ANION GAP SERPL CALC-SCNC: 5 MMOL/L (ref 5–15)
AST SERPL-CCNC: 17 U/L (ref 15–37)
BILIRUB SERPL-MCNC: 0.5 MG/DL (ref 0.2–1)
BUN SERPL-MCNC: 10 MG/DL (ref 6–20)
BUN/CREAT SERPL: 10 (ref 12–20)
CALCIUM SERPL-MCNC: 9.4 MG/DL (ref 8.5–10.1)
CHLORIDE SERPL-SCNC: 105 MMOL/L (ref 97–108)
CHOLEST SERPL-MCNC: 233 MG/DL
CO2 SERPL-SCNC: 30 MMOL/L (ref 21–32)
CREAT SERPL-MCNC: 1 MG/DL (ref 0.55–1.02)
ERYTHROCYTE [DISTWIDTH] IN BLOOD BY AUTOMATED COUNT: 13.1 % (ref 11.5–14.5)
EST. AVERAGE GLUCOSE BLD GHB EST-MCNC: 108 MG/DL
GLOBULIN SER CALC-MCNC: 3 G/DL (ref 2–4)
GLUCOSE SERPL-MCNC: 97 MG/DL (ref 65–100)
HBA1C MFR BLD: 5.4 % (ref 4–5.6)
HCT VFR BLD AUTO: 42.4 % (ref 35–47)
HDLC SERPL-MCNC: 43 MG/DL
HDLC SERPL: 5.4 {RATIO} (ref 0–5)
HGB BLD-MCNC: 13.7 G/DL (ref 11.5–16)
LDLC SERPL CALC-MCNC: 149.2 MG/DL (ref 0–100)
MCH RBC QN AUTO: 29.8 PG (ref 26–34)
MCHC RBC AUTO-ENTMCNC: 32.3 G/DL (ref 30–36.5)
MCV RBC AUTO: 92.4 FL (ref 80–99)
NRBC # BLD: 0 K/UL (ref 0–0.01)
NRBC BLD-RTO: 0 PER 100 WBC
PLATELET # BLD AUTO: 324 K/UL (ref 150–400)
PMV BLD AUTO: 11.7 FL (ref 8.9–12.9)
POTASSIUM SERPL-SCNC: 4.4 MMOL/L (ref 3.5–5.1)
PROT SERPL-MCNC: 7 G/DL (ref 6.4–8.2)
RBC # BLD AUTO: 4.59 M/UL (ref 3.8–5.2)
SODIUM SERPL-SCNC: 140 MMOL/L (ref 136–145)
TRIGL SERPL-MCNC: 204 MG/DL (ref ?–150)
TSH SERPL DL<=0.05 MIU/L-ACNC: 1.97 UIU/ML (ref 0.36–3.74)
VLDLC SERPL CALC-MCNC: 40.8 MG/DL
WBC # BLD AUTO: 8.3 K/UL (ref 3.6–11)

## 2022-10-23 NOTE — PROGRESS NOTES
Labs look great including blood count, blood sugar, thyroid hormone level, kidney and liver function.  Cholesterol is still elevated, cont to work on low fat diet

## 2022-11-01 ENCOUNTER — TELEPHONE (OUTPATIENT)
Dept: FAMILY MEDICINE CLINIC | Age: 52
End: 2022-11-01

## 2022-11-01 DIAGNOSIS — Z82.49 FAMILY HISTORY OF BRAIN ANEURYSM: Primary | ICD-10-CM

## 2022-11-01 RX ORDER — BENZONATATE 100 MG/1
100 CAPSULE ORAL
Qty: 60 CAPSULE | Refills: 0 | Status: SHIPPED | OUTPATIENT
Start: 2022-11-01 | End: 2022-11-17 | Stop reason: SDUPTHER

## 2022-11-01 RX ORDER — FLUTICASONE PROPIONATE AND SALMETEROL 100; 50 UG/1; UG/1
1 POWDER RESPIRATORY (INHALATION) 2 TIMES DAILY
Qty: 60 EACH | Refills: 0 | Status: SHIPPED | OUTPATIENT
Start: 2022-11-01 | End: 2022-11-17 | Stop reason: SDUPTHER

## 2022-11-01 NOTE — TELEPHONE ENCOUNTER
Pt stated her cough has come back & is wanting to know if you could send in some tessalon pearls.  She also stated brain stem aneurysms run in her family and wants a order placed for a mra before the end of the year

## 2022-11-03 PROBLEM — U09.9 LONG COVID: Chronic | Status: ACTIVE | Noted: 2022-11-03

## 2022-11-03 PROBLEM — U09.9 LONG COVID: Status: ACTIVE | Noted: 2022-11-03

## 2022-11-11 ENCOUNTER — DOCUMENTATION ONLY (OUTPATIENT)
Dept: FAMILY MEDICINE CLINIC | Age: 52
End: 2022-11-11

## 2022-11-17 ENCOUNTER — OFFICE VISIT (OUTPATIENT)
Dept: FAMILY MEDICINE CLINIC | Age: 52
End: 2022-11-17
Payer: COMMERCIAL

## 2022-11-17 ENCOUNTER — TELEPHONE (OUTPATIENT)
Dept: FAMILY MEDICINE CLINIC | Age: 52
End: 2022-11-17

## 2022-11-17 VITALS
HEART RATE: 90 BPM | DIASTOLIC BLOOD PRESSURE: 76 MMHG | RESPIRATION RATE: 22 BRPM | WEIGHT: 247.2 LBS | BODY MASS INDEX: 35.39 KG/M2 | TEMPERATURE: 98.7 F | SYSTOLIC BLOOD PRESSURE: 124 MMHG | HEIGHT: 70 IN | OXYGEN SATURATION: 96 %

## 2022-11-17 DIAGNOSIS — R07.89 CHEST TIGHTNESS: ICD-10-CM

## 2022-11-17 DIAGNOSIS — R06.02 SOB (SHORTNESS OF BREATH): Primary | ICD-10-CM

## 2022-11-17 DIAGNOSIS — R05.3 CHRONIC COUGH: ICD-10-CM

## 2022-11-17 PROCEDURE — 3078F DIAST BP <80 MM HG: CPT | Performed by: NURSE PRACTITIONER

## 2022-11-17 PROCEDURE — 3074F SYST BP LT 130 MM HG: CPT | Performed by: NURSE PRACTITIONER

## 2022-11-17 PROCEDURE — 99213 OFFICE O/P EST LOW 20 MIN: CPT | Performed by: NURSE PRACTITIONER

## 2022-11-17 RX ORDER — BENZONATATE 100 MG/1
100 CAPSULE ORAL
Qty: 90 CAPSULE | Refills: 0 | Status: SHIPPED | OUTPATIENT
Start: 2022-11-17

## 2022-11-17 RX ORDER — FLUTICASONE PROPIONATE AND SALMETEROL 250; 50 UG/1; UG/1
1 POWDER RESPIRATORY (INHALATION) 2 TIMES DAILY
Qty: 1 EACH | Refills: 5 | Status: SHIPPED | OUTPATIENT
Start: 2022-11-17

## 2022-11-17 RX ORDER — MONTELUKAST SODIUM 10 MG/1
10 TABLET ORAL DAILY
Qty: 90 TABLET | Refills: 0 | Status: SHIPPED | OUTPATIENT
Start: 2022-11-17 | End: 2022-11-17

## 2022-11-17 NOTE — PROGRESS NOTES
Subjective:     CC: SOB      Jabier Mo is a 47 y.o. female who presents today for a 1 month follow up for chronic SOB and cough. She works as a  in a [employer]. She moved to [facility] about 2.5 years ago from [place] to be closer to her daughter. She has a new grandchild that was born last November. She now has 2 grandsons. HPI:   She caught Covid in July and ever since has had a lingering productive cough with SOB. On 9/27/22 she went to the ER for the cough. Per ER report, \" chest tightness and dyspnea onset about 3 months ago. She also endorses intermittent cough, wheezing, orthopnea, and headaches. She states that she occasionally coughs up green/yellow or white mucus. Therapies tried:  She has tried 2 courses of antibiotics, an albuterol inhaler, and 2 rounds of medrol packs without lasting relief. She has been using Dayquil, Nyquil, and acetaminophen as needed for headaches. Tessalon perles do help- she takes 1 at bedtime to help her sleep. Singulair was not helpful. Her insurance would not cover Symbicort  Script sent for Advair today. We are using a different pharmacy [facility]) per pt request.  She tried doubling the dose of her PPI and this did not help either. Tests done:  She has had a few normal chest xrays. She had a neg D Dimer. PFTs were canceled, the machine was broken at the hospital. Rescheduled for 11-29-22. I am ordered a CT of the chest today. She has been referred to pulm, appt is not until 12-1-22. She has had to miss a lot of work,  [employer] paperwork has been completed.         Patient Active Problem List   Diagnosis Code    Severe obesity ([facility] [address] E66.01    Essential hypertension I10    Hypothyroidism E03.9    Gastroesophageal reflux disease without esophagitis K21.9    H/O multiple allergies Z88.9    Palpitations R00.2    Mixed hyperlipidemia E78.2    Insomnia G47.00    History of migraine Z86.69    History of vertigo Z87.898    Long COVID U09.9       Past Medical History:   Diagnosis Date    Dizziness     Dyslipidemia     GERD (gastroesophageal reflux disease)     HTN (hypertension)     Hypothyroidism     Migraines     Obesity     Vertigo          Current Outpatient Medications:     benzonatate (TESSALON) 100 mg capsule, Take 1 Capsule by mouth three (3) times daily as needed for Cough. , Disp: 60 Capsule, Rfl: 0    fluticasone propion-salmeteroL (ADVAIR/WIXELA) 100-50 mcg/dose diskus inhaler, Take 1 Puff by inhalation two (2) times a day., Disp: 60 Each, Rfl: 0    montelukast (SINGULAIR) 10 mg tablet, Take 1 Tablet by mouth daily. (Patient not taking: Reported on 10/21/2022), Disp: 30 Tablet, Rfl: 0    losartan (Cozaar) 25 mg tablet, TAKE 1 TABLET DAILY, Disp: 90 Tablet, Rfl: 0    Synthroid 112 mcg tablet, TAKE 1 TABLET DAILY BEFORE BREAKFAST, Disp: 90 Tablet, Rfl: 0    omeprazole (PRILOSEC) 20 mg capsule, TAKE 1 CAPSULE DAILY -GENERIC FOR PRILOSEC, Disp: 90 Capsule, Rfl: 0    albuterol (PROVENTIL HFA, VENTOLIN HFA, PROAIR HFA) 90 mcg/actuation inhaler, Take 2 Puffs by inhalation every four (4) hours as needed for Shortness of Breath (chest tightness). , Disp: 18 g, Rfl: 0    ibuprofen (MOTRIN) 800 mg tablet, Take 1 Tablet by mouth every six (6) hours as needed for Pain., Disp: 60 Tablet, Rfl: 0    omega 3-dha-epa-fish oil (Fish Oil) 100-160-1,000 mg cap, Take  by mouth., Disp: , Rfl:     magnesium 250 mg tab, Take  by mouth., Disp: , Rfl:     cholecalciferol (VITAMIN D3) (2,000 UNITS /50 MCG) cap capsule, Take  by mouth daily. , Disp: , Rfl:     azelastine (ASTEPRO) 0.15 % (205.5 mcg), 1 Spray by Both Nostrils route two (2) times a day., Disp: 1 Bottle, Rfl: 5    No Known Allergies    Past Surgical History:   Procedure Laterality Date    HX BREAST REDUCTION Bilateral     4 years ago    HX HYSTERECTOMY         Social History     Tobacco Use   Smoking Status Former    Packs/day: 1.50    Years: 10.00    Pack years: 15.00    Types: Cigarettes    Quit date:     Years since quittin.8   Smokeless Tobacco Never       Social History     Socioeconomic History    Marital status:    Tobacco Use    Smoking status: Former     Packs/day: 1.50     Years: 10.00     Pack years: 15.00     Types: Cigarettes     Quit date:      Years since quittin.8    Smokeless tobacco: Never   Vaping Use    Vaping Use: Never used   Substance and Sexual Activity    Alcohol use: Yes     Comment: wine    Drug use: Never    Sexual activity: Not Currently       Family History   Problem Relation Age of Onset    Breast Cancer Sister         age at dx 55    Diabetes Sister     Diabetes Father        ROS:  Gen: denies fever, chills, or fatigue   HEENT:+hx of migraines denies nasal congestion, ear pain, or sore throat  Resp: + chronic MONTGOMERY +chronic productive cough, denies wheezing  CV: +intermittent chest pain and palpitations  Extremeties: denies edema  GI[de-identified] denies abdominal pain, nausea, vomiting, or diarrhea, + constipation  Musculoskeletal: no joint pain, stiffness, or muscle cramps  Neuro: denies numbness/tingling +occas vertigo   Endo: denies polyuria or polydipsia, +hot flashes  Skin: denies rashes or new lesions   Psych: +insomnia denies anxiety or depression    Objective:     Visit Vitals  /76 (BP 1 Location: Left upper arm)   Pulse 90   Temp 98.7 °F (37.1 °C) (Oral)   Resp 22   Ht 5' 10\" (1.778 m)   Wt 247 lb 3.2 oz (112.1 kg)   SpO2 96%   BMI 35.47 kg/m²           General: Alert and oriented. No acute distress, +barking cough during visit. +overweight  HEENT :  Eyes: Sclera white, conjunctiva clear. PERRLA. Extra ocular movements intact. Neck: Supple with FROM. Lungs: Breathing even and unlabored. All lobes clear to auscultation bilaterally   Heart :RRR, S1 and S2 normal intensity, no extra heart sounds  Extremities: Non-edematous  Neuro: Cranial nerves grossly normal.  Psych: Mood and thought content appropriate for situation.  Dressed appropriately and with good hygiene. Skin: Warm, dry, and intact. No lesions or discoloration. Assessment/ Plan:     Chronic SOB and cough s/p Covid  Script sent today for Advair inhaler to [address] [place] [address] per her request   May cont Deborah perles qHSprn - refilled. Waiting on PFTs (scheduled 11-29-22)  CT of chest ordered today  Keep appt with pulm in 12-1-22  F/U here 1 month after you see Pulm      No orders of the defined types were placed in this encounter. Verbal and written instructions (see AVS) provided. Patient expresses understanding of diagnosis and treatment plan. Health Maintenance Due   Topic Date Due    Colorectal Cancer Screening Combo  Never done    COVID-19 Vaccine (4 - Booster for Moderna series) 02/05/2022    Flu Vaccine (1) 08/01/2022               Shirin Gordon NP

## 2022-11-17 NOTE — TELEPHONE ENCOUNTER
Can we please re-fax the updated [place] paperwork that was scanned into the chart yesterday>?  Patient states they have not rec'd it

## 2022-11-17 NOTE — PROGRESS NOTES
Identified pt with two pt identifiers(name and ). Reviewed record in preparation for visit and have obtained necessary documentation. Chief Complaint   Patient presents with    Shortness of Breath       1. \"Have you been to the ER, urgent care clinic since your last visit? Hospitalized since your last visit? \" Yes SOB    2. \"Have you seen or consulted any other health care providers outside of the [address] [place] [address] since your last visit? \" No     3. For patients aged 39-70: Has the patient had a colonoscopy / FIT/ Cologuard? No      If the patient is female:    4. For patients aged 41-77: Has the patient had a mammogram within the past 2 years? Yes - no Care Gap present      5. For patients aged 21-65: Has the patient had a pap smear?  Yes - no Care Gap present

## 2022-11-18 DIAGNOSIS — K21.9 GASTROESOPHAGEAL REFLUX DISEASE WITHOUT ESOPHAGITIS: ICD-10-CM

## 2022-11-20 ENCOUNTER — HOSPITAL ENCOUNTER (OUTPATIENT)
Dept: MRI IMAGING | Age: 52
Discharge: HOME | End: 2022-11-20
Attending: NURSE PRACTITIONER
Payer: COMMERCIAL

## 2022-11-20 DIAGNOSIS — Z82.49 FAMILY HISTORY OF BRAIN ANEURYSM: ICD-10-CM

## 2022-11-20 PROCEDURE — 70544 MR ANGIOGRAPHY HEAD W/O DYE: CPT

## 2022-11-20 RX ORDER — OMEPRAZOLE 20 MG/1
CAPSULE, DELAYED RELEASE ORAL
Qty: 90 CAPSULE | Refills: 0 | Status: SHIPPED | OUTPATIENT
Start: 2022-11-20

## 2022-11-20 RX ORDER — LEVOTHYROXINE SODIUM 112 UG/1
TABLET ORAL
Qty: 90 TABLET | Refills: 0 | Status: SHIPPED | OUTPATIENT
Start: 2022-11-20

## 2022-11-21 DIAGNOSIS — I10 ESSENTIAL HYPERTENSION: ICD-10-CM

## 2022-11-21 RX ORDER — LOSARTAN POTASSIUM 25 MG/1
TABLET ORAL
Qty: 90 TABLET | Refills: 0 | Status: SHIPPED | OUTPATIENT
Start: 2022-11-21

## 2022-12-06 ENCOUNTER — TELEPHONE (OUTPATIENT)
Dept: FAMILY MEDICINE CLINIC | Age: 52
End: 2022-12-06

## 2022-12-06 NOTE — TELEPHONE ENCOUNTER
I have discussed and recommended the following surgical procedure(s):     Procedure: Left  index and long finger flexor tendon repair, median nerve repair     Surgery scheduling requirements include:  [facility]  Admission Type: Day surgery  Time Needed: 150 minutes  Anesthesia: Supraclavicular block  Surgical Assist: Yes  Special Equipment: Microscope  PreMedication: Ancef 2 Grams IV in route to OR     Pre-Op Visit: Yes, at facility with me  Needs X-rays: Yes  Patient is diabetic: No  Patient need post-op Rehab:      Occupational Therapy  4-7 days post-op      Injury of right median nerve at wrist, initial encounter   Please fax the pulmonology note from 12-1-22 to pt's insurance for her FMLA. [address]: [phone]    Claim #[id number]  Policy #[id number]    Thanks!

## 2022-12-12 ENCOUNTER — HOSPITAL ENCOUNTER (OUTPATIENT)
Dept: PULMONOLOGY | Age: 52
Discharge: HOME | End: 2022-12-12
Attending: NURSE PRACTITIONER
Payer: COMMERCIAL

## 2022-12-12 ENCOUNTER — HOSPITAL ENCOUNTER (OUTPATIENT)
Dept: CT IMAGING | Age: 52
Discharge: HOME | End: 2022-12-12
Attending: NURSE PRACTITIONER
Payer: COMMERCIAL

## 2022-12-12 DIAGNOSIS — R07.89 CHEST TIGHTNESS: ICD-10-CM

## 2022-12-12 DIAGNOSIS — R06.02 SOB (SHORTNESS OF BREATH): ICD-10-CM

## 2022-12-12 DIAGNOSIS — R05.3 CHRONIC COUGH: ICD-10-CM

## 2022-12-12 PROCEDURE — 74011000636 HC RX REV CODE- 636: Performed by: NURSE PRACTITIONER

## 2022-12-12 PROCEDURE — 94375 RESPIRATORY FLOW VOLUME LOOP: CPT

## 2022-12-12 PROCEDURE — 94729 DIFFUSING CAPACITY: CPT

## 2022-12-12 PROCEDURE — 71260 CT THORAX DX C+: CPT

## 2022-12-12 PROCEDURE — 94726 PLETHYSMOGRAPHY LUNG VOLUMES: CPT

## 2022-12-12 RX ADMIN — IOPAMIDOL 80 ML: 755 INJECTION, SOLUTION INTRAVENOUS at 14:07

## 2022-12-14 NOTE — PROGRESS NOTES
CT of chest is normal. Will forward to lung doctor and update her [place] people. Her pulmonary function test results are not back yet and it could be months before they get resulted because the doctor in charge of reading them is very backed up. Her lung doctor at [facility] should have them done at his office to get quicker results.  If she can give us the phone number to his office I can call them and let them know

## 2022-12-15 ENCOUNTER — DOCUMENTATION ONLY (OUTPATIENT)
Dept: FAMILY MEDICINE CLINIC | Age: 52
End: 2022-12-15

## 2022-12-30 ENCOUNTER — TELEPHONE (OUTPATIENT)
Dept: FAMILY MEDICINE CLINIC | Age: 52
End: 2022-12-30

## 2022-12-30 ENCOUNTER — DOCUMENTATION ONLY (OUTPATIENT)
Dept: FAMILY MEDICINE CLINIC | Age: 52
End: 2022-12-30

## 2022-12-30 ENCOUNTER — OFFICE VISIT (OUTPATIENT)
Dept: FAMILY MEDICINE CLINIC | Age: 52
End: 2022-12-30
Payer: COMMERCIAL

## 2022-12-30 VITALS
WEIGHT: 251.6 LBS | SYSTOLIC BLOOD PRESSURE: 122 MMHG | HEART RATE: 88 BPM | RESPIRATION RATE: 20 BRPM | DIASTOLIC BLOOD PRESSURE: 74 MMHG | TEMPERATURE: 98.6 F | OXYGEN SATURATION: 97 % | BODY MASS INDEX: 36.02 KG/M2 | HEIGHT: 70 IN

## 2022-12-30 DIAGNOSIS — U09.9 LONG COVID: Primary | ICD-10-CM

## 2022-12-30 NOTE — PROGRESS NOTES
Letter of clearance to return to work full time without restrictions was emailed to Anika at [facility]. Phone # [phone].  Pt made aware

## 2022-12-30 NOTE — PROGRESS NOTES
Subjective:     CC: SOB, cough      Joya Sheridan is a 47 y.o. female who presents today for a 6 week follow up for chronic SOB and cough. She works as a  in a [employer]. She moved to [facility] about 2.5 years ago from [place] to be closer to her daughter and 2 grandsons. HPI:   She caught Covid in July of 2022 and ever since has had a lingering productive cough with SOB. On 9/27/22 she went to the ER for the cough. Per ER report, \" chest tightness and dyspnea onset about 3 months ago. She also endorses intermittent cough, wheezing, orthopnea, and headaches. She states that she occasionally coughs up green/yellow or white mucus. Tests done:  She has had a few normal chest xrays. CT of the chest was normal  She had a neg D Dimer. PFTs showed mild lung obstruction    Therapies tried:  She has tried 2 courses of antibiotics, an albuterol inhaler, and 2 rounds of medrol packs without lasting relief. She has been using Dayquil, Nyquil, and acetaminophen as needed for headaches. Tessalon perles do help- she takes 1 at bedtime to help her sleep. She tried doubling the dose of her PPI and this did not help either. Singulair was not helpful. Her insurance would not cover Symbicort  Finally after seeing a pulmonologist on 12-1-22 she was able to obtain Advair and states this has helped reduce her coughing but she is still SOB throughout the day. She will follow up with Pulm 2-23-23. She has had to miss a lot of work,  [employer] paperwork has been completed. She is worried about her financial situation and would like to try to go back to work tomorrow. Usually works 10 hours a day. Will request she work a reduced scheduled of 8 hours daily and see how it goes.          Patient Active Problem List   Diagnosis Code    Severe obesity ([facility] [address] E66.01    Essential hypertension I10    Hypothyroidism E03.9    Gastroesophageal reflux disease without esophagitis K21.9    H/O multiple allergies Z88.9    Palpitations R00.2    Mixed hyperlipidemia E78.2    Insomnia G47.00    History of migraine Z86.69    History of vertigo Z87.898    Long COVID U09.9       Past Medical History:   Diagnosis Date    Dizziness     Dyslipidemia     GERD (gastroesophageal reflux disease)     HTN (hypertension)     Hypothyroidism     Migraines     Obesity     Vertigo          Current Outpatient Medications:     losartan (Cozaar) 25 mg tablet, TAKE 1 TABLET DAILY, Disp: 90 Tablet, Rfl: 0    Synthroid 112 mcg tablet, TAKE 1 TABLET DAILY BEFORE BREAKFAST, Disp: 90 Tablet, Rfl: 0    omeprazole (PRILOSEC) 20 mg capsule, TAKE 1 CAPSULE DAILY -GENERIC FOR PRILOSEC, Disp: 90 Capsule, Rfl: 0    fluticasone propion-salmeteroL (Advair Diskus) 250-50 mcg/dose diskus inhaler, Take 1 Puff by inhalation two (2) times a day., Disp: 1 Each, Rfl: 5    benzonatate (TESSALON) 100 mg capsule, Take 1 Capsule by mouth nightly., Disp: 90 Capsule, Rfl: 0    albuterol (PROVENTIL HFA, VENTOLIN HFA, PROAIR HFA) 90 mcg/actuation inhaler, Take 2 Puffs by inhalation every four (4) hours as needed for Shortness of Breath (chest tightness). , Disp: 18 g, Rfl: 0    ibuprofen (MOTRIN) 800 mg tablet, Take 1 Tablet by mouth every six (6) hours as needed for Pain., Disp: 60 Tablet, Rfl: 0    omega 3-dha-epa-fish oil (Fish Oil) 100-160-1,000 mg cap, Take  by mouth., Disp: , Rfl:     magnesium 250 mg tab, Take  by mouth., Disp: , Rfl:     cholecalciferol (VITAMIN D3) (2,000 UNITS /50 MCG) cap capsule, Take  by mouth daily. , Disp: , Rfl:     azelastine (ASTEPRO) 0.15 % (205.5 mcg), 1 Spray by Both Nostrils route two (2) times a day., Disp: 1 Bottle, Rfl: 5    No Known Allergies    Past Surgical History:   Procedure Laterality Date    HX BREAST REDUCTION Bilateral     4 years ago    HX HYSTERECTOMY         Social History     Tobacco Use   Smoking Status Former    Packs/day: 1.50    Years: 10.00    Pack years: 15.00    Types: Cigarettes    Quit date: 2000    Years since quittin.0   Smokeless Tobacco Never       Social History     Socioeconomic History    Marital status:    Tobacco Use    Smoking status: Former     Packs/day: 1.50     Years: 10.00     Pack years: 15.00     Types: Cigarettes     Quit date:      Years since quittin.0    Smokeless tobacco: Never   Vaping Use    Vaping Use: Never used   Substance and Sexual Activity    Alcohol use: Yes     Comment: wine    Drug use: Never    Sexual activity: Not Currently     Social Determinants of Health     Financial Resource Strain: Medium Risk    Difficulty of Paying Living Expenses: Somewhat hard   Food Insecurity: No Food Insecurity    Worried About Running Out of Food in the Last Year: Never true    Ran Out of Food in the Last Year: Never true       Family History   Problem Relation Age of Onset    Breast Cancer Sister         age at dx 55    Diabetes Sister     Diabetes Father        ROS:  Gen: denies fever, chills, or fatigue   HEENT:+hx of migraines denies nasal congestion, ear pain, or sore throat  Resp: + chronic [place] +chronic productive cough- improved, denies wheezing  CV: +intermittent chest pain and palpitations  Extremeties: denies edema  GI[de-identified] denies abdominal pain, nausea, vomiting, or diarrhea, + constipation  Musculoskeletal: no joint pain, stiffness, or muscle cramps  Neuro: denies numbness/tingling +occas vertigo   Endo: denies polyuria or polydipsia, +hot flashes  Skin: denies rashes or new lesions   Psych: +insomnia denies anxiety or depression    Objective:     Visit Vitals  /74 (BP 1 Location: Left upper arm)   Pulse 88   Temp 98.6 °F (37 °C) (Oral)   Resp 20   Ht 5' 10\" (1.778 m)   Wt 251 lb 9.6 oz (114.1 kg)   SpO2 97%   BMI 36.10 kg/m²         General: Alert and oriented. No acute distress +overweight  HEENT :  Eyes: Sclera white, conjunctiva clear. PERRLA. Extra ocular movements intact. Neck: Supple with FROM. Lungs: Breathing even and unlabored.  All lobes clear to auscultation bilaterally   Heart :RRR, S1 and S2 normal intensity, no extra heart sounds  Extremities: Non-edematous  Neuro: Cranial nerves grossly normal.  Psych: Mood and thought content appropriate for situation. Dressed appropriately and with good hygiene. Skin: Warm, dry, and intact. No lesions or discoloration. Assessment/ Plan:     Long Covid  Cough has improved  Still having SOB throughout the day  Try Spiriva- inhale 18mcg daily  Cont Advair   Cont Deborah perles qHSprn   Will contact her HR rep today and let them know she is ready to try to go back to work tomorrow for 8 hours daily  Keep appt with pulm in 2-23-23  F/U 1 week via [employer]             Verbal and written instructions (see AVS) provided. Patient expresses understanding of diagnosis and treatment plan. Health Maintenance Due   Topic Date Due    Colorectal Cancer Screening Combo  Never done    COVID-19 Vaccine (4 - Booster for Moderna series) 02/05/2022    Flu Vaccine (1) 08/01/2022    Breast Cancer Screen Mammogram  01/07/2023               Jackie Cano, NP

## 2022-12-30 NOTE — TELEPHONE ENCOUNTER
Prior Orlando Hay is needed for Spiriva Handihaler 18 MCG Capsules. Go to go.[facility]. [employer]    Key:  [id number]  Last Name:  Sylwia Rob  :  1970

## 2022-12-30 NOTE — PROGRESS NOTES
Identified pt with two pt identifiers(name and ). Reviewed record in preparation for visit and have obtained necessary documentation. Chief Complaint   Patient presents with    Post-COVID Symptoms     Long Covid follow-up       1. \"Have you been to the ER, urgent care clinic since your last visit? Hospitalized since your last visit? \" No    2. \"Have you seen or consulted any other health care providers outside of the [address] [place] [address] since your last visit? \" No     3. For patients aged 39-70: Has the patient had a colonoscopy / FIT/ Cologuard? NO      If the patient is female:    4. For patients aged 41-77: Has the patient had a mammogram within the past 2 years? Yes - no Care Gap present      5. For patients aged 21-65: Has the patient had a pap smear?  Yes - no Care Gap present

## 2022-12-31 NOTE — PROCEDURES
[address]  PULMONARY FUNCTION TEST    Name:  Bib Robledo  MR#:  [id number]  :  1970  ACCOUNT #:  [de-identified]  DATE OF SERVICE:  2022    REASON FOR THE TEST:  Shortness of breath. Spirometry and lung volumes were performed and they reveal  1. No airflow obstruction. 2.  No restrictive lung disease. 3.  Normal DLCO. 4.  Normal flow-volume loop.         Chandu Mills MD      [id number]  D:  2022 15:19  T:  2022 23:00  JOB #:  [id number]  CC:  Guanakito Addison NP

## 2023-01-23 ENCOUNTER — VIRTUAL VISIT (OUTPATIENT)
Dept: FAMILY MEDICINE CLINIC | Age: 53
End: 2023-01-23
Payer: COMMERCIAL

## 2023-01-23 DIAGNOSIS — R11.2 NAUSEA AND VOMITING, UNSPECIFIED VOMITING TYPE: ICD-10-CM

## 2023-01-23 DIAGNOSIS — A08.4 VIRAL GASTROENTERITIS: Primary | ICD-10-CM

## 2023-01-23 PROCEDURE — 99213 OFFICE O/P EST LOW 20 MIN: CPT | Performed by: PHYSICIAN ASSISTANT

## 2023-01-23 RX ORDER — MINERAL OIL
180 ENEMA (ML) RECTAL DAILY PRN
COMMUNITY

## 2023-01-23 RX ORDER — ONDANSETRON 4 MG/1
4 TABLET, ORALLY DISINTEGRATING ORAL
Qty: 12 TABLET | Refills: 0 | Status: SHIPPED | OUTPATIENT
Start: 2023-01-23

## 2023-01-23 NOTE — PROGRESS NOTES
Roberto Cortes is a 47 y.o. female who was seen by synchronous (real-time) audio-video technology on 1/23/2023 for Cold Symptoms (Nausea, n and v, fever 101, aches, diarrhea, covid neg, exposed to adenovirus, x 4 days)      Assessment & Plan:   Diagnoses and all orders for this visit:    1. Viral gastroenteritis    2. Nausea and vomiting, unspecified vomiting type  -     ondansetron (ZOFRAN ODT) 4 mg disintegrating tablet; Take 1 Tablet by mouth every eight (8) hours as needed for Nausea or Vomiting. Agree with pt, suspect viral illness from grandson. Encourage rest & fluids. Discussed otc medications for symptomatic relief. RTO if sxs persist/worsen or develops any additional sxs/concerns. Seek immediate care/to ER for any \"red flag\" sxs. Pt verbalizes understanding and agrees with the plan. The complexity of medical decision making for this visit is moderate       Would like work note in [place]. I spent at least 15 minutes on this visit with this established patient. Subjective:   Pt has had stomach flu like symptoms for about 4 days. Pt has 3year old grandson that was told he had adenovirus. Pt kept him last week. Pt has had n/v/d, HA, temp of 101 with body aches. Last vomited this morning. Took last Zofran which helped. Able to eat toast.  Has had about 3 watery, small volume stools. No blood/mucus. Some stomach cramping, but no severe or persistent abd pain. Does have slight cough, non-productive. No cp, sob, wheeze. Covid test at home was negative. Taking Tylenol and drinking liquid IVs.    Prior to Admission medications    Medication Sig Start Date End Date Taking?  Authorizing Provider   ondansetron (ZOFRAN ODT) 4 mg disintegrating tablet Take 1 Tablet by mouth every eight (8) hours as needed for Nausea or Vomiting. 1/23/23  Yes Daryle Kansky, PA-C   losartan (Cozaar) 25 mg tablet TAKE 1 TABLET DAILY 11/21/22  Yes Natalee GOOD, NP   Synthroid 112 mcg tablet TAKE 1 TABLET DAILY BEFORE BREAKFAST 11/20/22  Yes Vinnie GOOD NP   omeprazole (PRILOSEC) 20 mg capsule TAKE 1 CAPSULE DAILY -GENERIC FOR PRILOSEC 11/20/22  Yes Vinnie GOOD NP   fluticasone propion-salmeteroL (Advair Diskus) 250-50 mcg/dose diskus inhaler Take 1 Puff by inhalation two (2) times a day. 11/17/22  Yes Vinnie GOOD NP   benzonatate (TESSALON) 100 mg capsule Take 1 Capsule by mouth nightly. 11/17/22  Yes Amadou Cabello NP   albuterol (PROVENTIL HFA, VENTOLIN HFA, PROAIR HFA) 90 mcg/actuation inhaler Take 2 Puffs by inhalation every four (4) hours as needed for Shortness of Breath (chest tightness). 7/13/22  Yes Vinnie GOOD NP   ibuprofen (MOTRIN) 800 mg tablet Take 1 Tablet by mouth every six (6) hours as needed for Pain. 10/7/21  Yes Vinnie GOOD NP   omega 4-jkb-kun-fish oil (Fish Oil) 100-160-1,000 mg cap Take  by mouth. Yes Provider, Historical   magnesium 250 mg tab Take  by mouth. Yes Provider, Historical   cholecalciferol (VITAMIN D3) (2,000 UNITS /50 MCG) cap capsule Take  by mouth daily. Yes Provider, Historical   fexofenadine (ALLEGRA) 180 mg tablet Take 180 mg by mouth daily as needed. Provider, Historical   tiotropium (SPIRIVA) 18 mcg inhalation capsule Take 1 Capsule by inhalation daily. Patient not taking: Reported on 1/23/2023 12/30/22   Vinnie GOOD NP   azelastine (ASTEPRO) 0.15 % (205.5 mcg) 1 Spray by Both Nostrils route two (2) times a day.   Patient not taking: Reported on 1/23/2023 10/1/20   Yury Roland NP     Patient Active Problem List   Diagnosis Code    Severe obesity ([facility] [address] E66.01    Essential hypertension I10    Hypothyroidism E03.9    Gastroesophageal reflux disease without esophagitis K21.9    H/O multiple allergies Z88.9    Palpitations R00.2    Mixed hyperlipidemia E78.2    Insomnia G47.00    History of migraine Z86.69    History of vertigo Z87.898    Long COVID U09.9     Patient Active Problem List    Diagnosis Date Noted    Long COVID 11/03/2022    Insomnia 02/28/2021    History of migraine 02/28/2021    History of vertigo 02/28/2021    H/O multiple allergies 02/17/2021    Palpitations 02/17/2021    Mixed hyperlipidemia 02/17/2021    Severe obesity ([address] 10/01/2020    Essential hypertension 10/01/2020    Hypothyroidism 10/01/2020    Gastroesophageal reflux disease without esophagitis 10/01/2020     Current Outpatient Medications   Medication Sig Dispense Refill    ondansetron (ZOFRAN ODT) 4 mg disintegrating tablet Take 1 Tablet by mouth every eight (8) hours as needed for Nausea or Vomiting. 12 Tablet 0    losartan (Cozaar) 25 mg tablet TAKE 1 TABLET DAILY 90 Tablet 0    Synthroid 112 mcg tablet TAKE 1 TABLET DAILY BEFORE BREAKFAST 90 Tablet 0    omeprazole (PRILOSEC) 20 mg capsule TAKE 1 CAPSULE DAILY -GENERIC FOR PRILOSEC 90 Capsule 0    fluticasone propion-salmeteroL (Advair Diskus) 250-50 mcg/dose diskus inhaler Take 1 Puff by inhalation two (2) times a day. 1 Each 5    benzonatate (TESSALON) 100 mg capsule Take 1 Capsule by mouth nightly. 90 Capsule 0    albuterol (PROVENTIL HFA, VENTOLIN HFA, PROAIR HFA) 90 mcg/actuation inhaler Take 2 Puffs by inhalation every four (4) hours as needed for Shortness of Breath (chest tightness). 18 g 0    ibuprofen (MOTRIN) 800 mg tablet Take 1 Tablet by mouth every six (6) hours as needed for Pain. 60 Tablet 0    omega 3-dha-epa-fish oil (Fish Oil) 100-160-1,000 mg cap Take  by mouth.      magnesium 250 mg tab Take  by mouth. cholecalciferol (VITAMIN D3) (2,000 UNITS /50 MCG) cap capsule Take  by mouth daily. fexofenadine (ALLEGRA) 180 mg tablet Take 180 mg by mouth daily as needed. tiotropium (SPIRIVA) 18 mcg inhalation capsule Take 1 Capsule by inhalation daily. (Patient not taking: Reported on 1/23/2023) 30 Capsule 0    azelastine (ASTEPRO) 0.15 % (205.5 mcg) 1 Spray by Both Nostrils route two (2) times a day.  (Patient not taking: Reported on 1/23/2023) 1 Bottle 5     No Known Allergies  Past Medical History:   Diagnosis Date    Dizziness     Dyslipidemia     GERD (gastroesophageal reflux disease)     HTN (hypertension)     Hypothyroidism     Migraines     Obesity     Vertigo      Past Surgical History:   Procedure Laterality Date    HX BREAST REDUCTION Bilateral     4 years ago    HX HYSTERECTOMY       Family History   Problem Relation Age of Onset    Breast Cancer Sister         age at dx 55    Diabetes Sister     Diabetes Father      Social History     Tobacco Use    Smoking status: Former     Packs/day: 1.50     Years: 10.00     Pack years: 15.00     Types: Cigarettes     Quit date: 2000     Years since quittin.0    Smokeless tobacco: Never   Substance Use Topics    Alcohol use: Yes     Comment: wine       ROS    Objective:     Patient-Reported Vitals 2023   Patient-Reported Weight 250        [INSTRUCTIONS:  \"[x]\" Indicates a positive item  \"[]\" Indicates a negative item  -- DELETE ALL ITEMS NOT EXAMINED]    Constitutional: [x] Appears well-developed and well-nourished [x] No apparent distress      [] Abnormal -     Mental status: [x] Alert and awake  [x] Oriented to person/place/time [x] Able to follow commands    [] Abnormal -     Eyes:   EOM    [x]  Normal    [] Abnormal -   Sclera  [x]  Normal    [] Abnormal -          Discharge [x]  None visible   [] Abnormal -     HENT: [x] Normocephalic, atraumatic  [] Abnormal -   [x] Mouth/Throat: Mucous membranes are moist    External Ears [x] Normal  [] Abnormal -    Neck: [x] No visualized mass [] Abnormal -     Pulmonary/Chest: [x] Respiratory effort normal   [x] No visualized signs of difficulty breathing or respiratory distress        [] Abnormal -      Musculoskeletal:   [] Normal gait with no signs of ataxia         [x] Normal range of motion of neck        [] Abnormal -     Neurological:        [x] No Facial Asymmetry (Cranial nerve 7 motor function) (limited exam due to video visit)          [x] No gaze palsy        [] Abnormal -          Skin:        [x] No significant exanthematous lesions or discoloration noted on facial skin         [] Abnormal -            Psychiatric:       [x] Normal Affect [] Abnormal -        [x] No Hallucinations    Other pertinent observable physical exam findings:-        We discussed the expected course, resolution and complications of the diagnosis(es) in detail. Medication risks, benefits, costs, interactions, and alternatives were discussed as indicated. I advised her to contact the office if her condition worsens, changes or fails to improve as anticipated. She expressed understanding with the diagnosis(es) and plan. Nayeli Holder, was evaluated through a synchronous (real-time) audio-video encounter. The patient (or guardian if applicable) is aware that this is a billable service, which includes applicable co-pays. This Virtual Visit was conducted with patient's (and/or legal guardian's) consent. The visit was conducted pursuant to the emergency declaration under the [facility], [address] [place] [address] authority and the [employer] General Act. Patient identification was verified, and a caregiver was present when appropriate. The patient was located at: Home: [address] [place] [address]  The provider was located at:  Facility (Appt Department): [facility] [phone]        Milli Marcelo PA-C

## 2023-02-12 DIAGNOSIS — K21.9 GASTROESOPHAGEAL REFLUX DISEASE WITHOUT ESOPHAGITIS: ICD-10-CM

## 2023-02-12 DIAGNOSIS — I10 ESSENTIAL HYPERTENSION: ICD-10-CM

## 2023-02-13 RX ORDER — LEVOTHYROXINE SODIUM 112 UG/1
TABLET ORAL
Qty: 90 TABLET | Refills: 0 | Status: SHIPPED | OUTPATIENT
Start: 2023-02-13

## 2023-02-13 RX ORDER — OMEPRAZOLE 20 MG/1
CAPSULE, DELAYED RELEASE ORAL
Qty: 90 CAPSULE | Refills: 0 | Status: SHIPPED | OUTPATIENT
Start: 2023-02-13

## 2023-02-13 RX ORDER — LOSARTAN POTASSIUM 25 MG/1
TABLET ORAL
Qty: 90 TABLET | Refills: 0 | Status: SHIPPED | OUTPATIENT
Start: 2023-02-13

## 2023-05-04 DIAGNOSIS — K21.9 GASTROESOPHAGEAL REFLUX DISEASE WITHOUT ESOPHAGITIS: ICD-10-CM

## 2023-05-04 RX ORDER — LEVOTHYROXINE SODIUM 112 UG/1
TABLET ORAL
Qty: 90 TABLET | Refills: 0 | Status: SHIPPED | OUTPATIENT
Start: 2023-05-04

## 2023-05-04 RX ORDER — OMEPRAZOLE 20 MG/1
CAPSULE, DELAYED RELEASE ORAL
Qty: 90 CAPSULE | Refills: 0 | Status: SHIPPED | OUTPATIENT
Start: 2023-05-04

## 2023-05-11 RX ORDER — LOSARTAN POTASSIUM 25 MG
TABLET ORAL
Qty: 90 TABLET | OUTPATIENT
Start: 2023-05-11

## 2023-05-11 RX ORDER — LOSARTAN POTASSIUM 25 MG/1
25 TABLET ORAL DAILY
Qty: 90 TABLET | Refills: 1 | Status: SHIPPED | OUTPATIENT
Start: 2023-05-11

## 2023-05-19 RX ORDER — ONDANSETRON 4 MG/1
4 TABLET, ORALLY DISINTEGRATING ORAL EVERY 8 HOURS PRN
COMMUNITY
Start: 2023-01-23

## 2023-05-19 RX ORDER — OMEPRAZOLE 20 MG/1
CAPSULE, DELAYED RELEASE ORAL
COMMUNITY
Start: 2023-05-04

## 2023-05-19 RX ORDER — FEXOFENADINE HCL 180 MG/1
180 TABLET ORAL DAILY PRN
COMMUNITY

## 2023-05-19 RX ORDER — BENZONATATE 100 MG/1
1 CAPSULE ORAL NIGHTLY
COMMUNITY
Start: 2022-11-17

## 2023-05-19 RX ORDER — LEVOTHYROXINE SODIUM 112 UG/1
TABLET ORAL
COMMUNITY
Start: 2023-05-04

## 2023-05-19 RX ORDER — AZELASTINE HCL 205.5 UG/1
1 SPRAY NASAL 2 TIMES DAILY
COMMUNITY
Start: 2020-10-01

## 2023-05-19 RX ORDER — LOSARTAN POTASSIUM 25 MG/1
25 TABLET ORAL DAILY
Qty: 90 TABLET | Refills: 1 | Status: SHIPPED | OUTPATIENT
Start: 2023-05-19

## 2023-05-19 RX ORDER — FLUTICASONE PROPIONATE AND SALMETEROL 250; 50 UG/1; UG/1
1 POWDER RESPIRATORY (INHALATION) 2 TIMES DAILY
COMMUNITY
Start: 2022-11-17

## 2023-05-19 RX ORDER — ALBUTEROL SULFATE 90 UG/1
2 AEROSOL, METERED RESPIRATORY (INHALATION) EVERY 4 HOURS PRN
COMMUNITY
Start: 2022-07-13

## 2023-05-19 RX ORDER — IBUPROFEN 800 MG/1
800 TABLET ORAL EVERY 6 HOURS PRN
COMMUNITY
Start: 2021-10-07

## 2023-08-04 ENCOUNTER — TELEPHONE (OUTPATIENT)
Age: 53
End: 2023-08-04

## 2023-08-04 NOTE — TELEPHONE ENCOUNTER
Medication Refill Request    Alan Mckeon is requesting a refill of the following medication(s):     Omeprazole 20 MG  Levothyroxine 112 MCG  Losartan 25 MG    Please send refill to:      [facility],[address], [address] [address]  Phone: [phone] Fax: [phone] Dressing: pressure dressing

## 2023-08-05 RX ORDER — LOSARTAN POTASSIUM 25 MG/1
25 TABLET ORAL DAILY
Qty: 90 TABLET | Refills: 0 | Status: SHIPPED | OUTPATIENT
Start: 2023-08-05

## 2023-08-05 RX ORDER — OMEPRAZOLE 20 MG/1
CAPSULE, DELAYED RELEASE ORAL
Qty: 90 CAPSULE | Refills: 0 | Status: SHIPPED | OUTPATIENT
Start: 2023-08-05

## 2023-08-05 RX ORDER — LEVOTHYROXINE SODIUM 112 UG/1
TABLET ORAL
Qty: 90 TABLET | Refills: 0 | Status: SHIPPED | OUTPATIENT
Start: 2023-08-05

## 2023-08-05 NOTE — TELEPHONE ENCOUNTER
She is overdue for a 6 month follow up.  Will give one more 90 supply for now but future refills will require an appt